# Patient Record
Sex: MALE | Race: BLACK OR AFRICAN AMERICAN | NOT HISPANIC OR LATINO | Employment: OTHER | URBAN - METROPOLITAN AREA
[De-identification: names, ages, dates, MRNs, and addresses within clinical notes are randomized per-mention and may not be internally consistent; named-entity substitution may affect disease eponyms.]

---

## 2017-01-06 ENCOUNTER — INFUSION (OUTPATIENT)
Dept: INFUSION THERAPY | Facility: HOSPITAL | Age: 82
End: 2017-01-06
Attending: INTERNAL MEDICINE
Payer: MEDICARE

## 2017-01-06 VITALS
HEART RATE: 74 BPM | SYSTOLIC BLOOD PRESSURE: 154 MMHG | DIASTOLIC BLOOD PRESSURE: 72 MMHG | TEMPERATURE: 98 F | RESPIRATION RATE: 20 BRPM | WEIGHT: 156.5 LBS | BODY MASS INDEX: 26.87 KG/M2

## 2017-01-06 DIAGNOSIS — C90.00 IGA MYELOMA: ICD-10-CM

## 2017-01-06 DIAGNOSIS — C90.00 IGA MYELOMA: Primary | ICD-10-CM

## 2017-01-06 PROCEDURE — 63600175 PHARM REV CODE 636 W HCPCS: Performed by: INTERNAL MEDICINE

## 2017-01-06 PROCEDURE — 96401 CHEMO ANTI-NEOPL SQ/IM: CPT

## 2017-01-06 RX ORDER — DEXAMETHASONE 4 MG/1
20 TABLET ORAL
Status: COMPLETED | OUTPATIENT
Start: 2017-01-06 | End: 2017-01-06

## 2017-01-06 RX ORDER — ACYCLOVIR 400 MG/1
400 TABLET ORAL DAILY
Qty: 90 TABLET | Refills: 3 | Status: SHIPPED | OUTPATIENT
Start: 2017-01-06 | End: 2017-12-22

## 2017-01-06 RX ORDER — BORTEZOMIB 3.5 MG/1
1.3 INJECTION, POWDER, LYOPHILIZED, FOR SOLUTION INTRAVENOUS; SUBCUTANEOUS
Status: COMPLETED | OUTPATIENT
Start: 2017-01-06 | End: 2017-01-06

## 2017-01-06 RX ADMIN — BORTEZOMIB 2.3 MG: 3.5 INJECTION, POWDER, LYOPHILIZED, FOR SOLUTION INTRAVENOUS; SUBCUTANEOUS at 11:01

## 2017-01-06 RX ADMIN — DEXAMETHASONE 20 MG: 4 TABLET ORAL at 10:01

## 2017-01-13 ENCOUNTER — INFUSION (OUTPATIENT)
Dept: INFUSION THERAPY | Facility: HOSPITAL | Age: 82
End: 2017-01-13
Attending: INTERNAL MEDICINE
Payer: MEDICARE

## 2017-01-13 VITALS
TEMPERATURE: 97 F | HEIGHT: 64 IN | SYSTOLIC BLOOD PRESSURE: 111 MMHG | WEIGHT: 156.5 LBS | HEART RATE: 61 BPM | DIASTOLIC BLOOD PRESSURE: 55 MMHG | RESPIRATION RATE: 20 BRPM | BODY MASS INDEX: 26.72 KG/M2

## 2017-01-13 DIAGNOSIS — C90.00 IGA MYELOMA: Primary | ICD-10-CM

## 2017-01-13 PROCEDURE — 96401 CHEMO ANTI-NEOPL SQ/IM: CPT

## 2017-01-13 PROCEDURE — 63600175 PHARM REV CODE 636 W HCPCS: Performed by: INTERNAL MEDICINE

## 2017-01-13 RX ORDER — DEXAMETHASONE 4 MG/1
20 TABLET ORAL
Status: COMPLETED | OUTPATIENT
Start: 2017-01-13 | End: 2017-01-13

## 2017-01-13 RX ORDER — BORTEZOMIB 3.5 MG/1
1.3 INJECTION, POWDER, LYOPHILIZED, FOR SOLUTION INTRAVENOUS; SUBCUTANEOUS
Status: COMPLETED | OUTPATIENT
Start: 2017-01-13 | End: 2017-01-13

## 2017-01-13 RX ADMIN — DEXAMETHASONE 20 MG: 4 TABLET ORAL at 10:01

## 2017-01-13 RX ADMIN — BORTEZOMIB 2.3 MG: 3.5 INJECTION, POWDER, LYOPHILIZED, FOR SOLUTION INTRAVENOUS; SUBCUTANEOUS at 10:01

## 2017-01-13 NOTE — MR AVS SNAPSHOT
Patient Information     Patient Name Sex Mika Banks Male 1935      Visit Information        Provider Department Dept Phone Center    2017 10:30 AM CHAIR 07 formerly [facility] Chemotherapy Infusion [phone] [facility]      Patient Instructions     None      Your Current Medications Are     acyclovir (ZOVIRAX) 400 MG tablet    amlodipine (NORVASC) 10 MG tablet    atenolol (TENORMIN) 50 MG tablet    bortezomib (VELCADE) 3.5 mg injection    brimonidine 0.15 % OPTH DROP (ALPHAGAN) 0.15 % ophthalmic solution    dexamethasone (DECADRON) 4 MG Tab    difluprednate (DUREZOL) 0.05 % Drop ophthalmic solution    dorzolamide-timolol 2-0.5% (COSOPT) 2-0.5 % ophthalmic solution    gabapentin (NEURONTIN) 100 MG capsule    latanoprost (XALATAN) 0.005 % ophthalmic solution    tamsulosin (FLOMAX) 0.4 mg Cp24      Facility-Administered Medications     bortezomib (VELCADE) injection 2.3 mg    dexamethasone tablet 20 mg      Appointments for Next Year     2017  9:30 AM FASTING LAB (15 min.) [facility], [place]    1. Do not eat or drink anything for TEN HOURS (10) PRIOR TO TEST. Do not chew gum or eat candy mints, even those claiming to be sugar free. Water is allowed but do not drink any other fluids 2. Take your regular daily medicines as your doctor has ordered. If you are diabetic, do not take your insulin or other diabetic medication until your blood is drawn and you are ready to eat. Your physician may have special instructions for diabetics. Check with your doctor if you have any questions.3. Alcoholic beverages are not allowed starting at 6:00pm the evening before your appointment.    2017 10:00 AM PHYSICAL - ESTABLISHED PATIENT (20 min.) [facility] Internal Medicine Lily Valladares MD    Arrive at check-in approximately 15 minutes before your scheduled appointment time. Bring all outside medical records and imaging, along with a list of your current medications and insurance  "card.         Default Flowsheet Data (last 24 hours)      Amb Complex Vitals Ernie        01/13/17 1020                Measurements    Weight 71 kg (156 lb 8.4 oz)        Height 5' 4" (1.626 m)        BSA (Calculated - sq m) 1.79 sq meters        BMI (Calculated) 26.9        BP (!)  111/55        Temp 97.3 °F (36.3 °C)        Pulse 61        Resp 20        Pain Assessment    Pain Score Three        Pain Frequency 2 Intermittent        Pain Loc ABDOMEN                Allergies     No Known Allergies      Medications You Received from 01/12/2017 1050 to 01/13/2017 1050        Date/Time Order Dose Route Action     01/13/2017 1046 bortezomib (VELCADE) injection 2.3 mg 2.3 mg Subcutaneous Given     01/13/2017 1022 dexamethasone tablet 20 mg 20 mg Oral Given      Current Discharge Medication List     Cannot display discharge medications since this is not an admission.      "

## 2017-01-13 NOTE — NURSING
Pt tolerated chemo well (Velcadeinj SQ to LUQ abdomen). No adverse reaction noted. Pt education reinforced on chemo regimen, side effects, what to expect, and when to call . Pt verbalized understanding. I reviewed pt calendar w/ pt/family and understanding verbalized.

## 2017-01-27 ENCOUNTER — INFUSION (OUTPATIENT)
Dept: INFUSION THERAPY | Facility: HOSPITAL | Age: 82
End: 2017-01-27
Attending: INTERNAL MEDICINE
Payer: MEDICARE

## 2017-01-27 VITALS
DIASTOLIC BLOOD PRESSURE: 55 MMHG | HEART RATE: 63 BPM | SYSTOLIC BLOOD PRESSURE: 112 MMHG | HEIGHT: 64 IN | WEIGHT: 156.5 LBS | BODY MASS INDEX: 26.72 KG/M2 | TEMPERATURE: 98 F | RESPIRATION RATE: 20 BRPM

## 2017-01-27 DIAGNOSIS — C90.00 IGA MYELOMA: Primary | ICD-10-CM

## 2017-01-27 PROCEDURE — 63600175 PHARM REV CODE 636 W HCPCS: Mod: JW | Performed by: INTERNAL MEDICINE

## 2017-01-27 PROCEDURE — 96401 CHEMO ANTI-NEOPL SQ/IM: CPT

## 2017-01-27 RX ORDER — BORTEZOMIB 3.5 MG/1
1.3 INJECTION, POWDER, LYOPHILIZED, FOR SOLUTION INTRAVENOUS; SUBCUTANEOUS
Status: COMPLETED | OUTPATIENT
Start: 2017-01-27 | End: 2017-01-27

## 2017-01-27 RX ORDER — DEXAMETHASONE 4 MG/1
20 TABLET ORAL
Status: COMPLETED | OUTPATIENT
Start: 2017-01-27 | End: 2017-01-27

## 2017-01-27 RX ADMIN — BORTEZOMIB 2.3 MG: 3.5 INJECTION, POWDER, LYOPHILIZED, FOR SOLUTION INTRAVENOUS; SUBCUTANEOUS at 09:01

## 2017-01-27 RX ADMIN — DEXAMETHASONE 20 MG: 4 TABLET ORAL at 09:01

## 2017-01-27 NOTE — NURSING
Pt tolerated chemo (Velcade) well. No adverse reaction noted. Pt education reinforced on chemo regimen, side effects, what to expect, and when to call . Pt verbalized understanding. I reviewed pt calendar w/ pt and understanding verbalized.

## 2017-02-03 ENCOUNTER — INFUSION (OUTPATIENT)
Dept: INFUSION THERAPY | Facility: HOSPITAL | Age: 82
End: 2017-02-03
Attending: INTERNAL MEDICINE
Payer: MEDICARE

## 2017-02-03 ENCOUNTER — LAB VISIT (OUTPATIENT)
Dept: LAB | Facility: HOSPITAL | Age: 82
End: 2017-02-03
Attending: INTERNAL MEDICINE
Payer: MEDICARE

## 2017-02-03 VITALS
WEIGHT: 156.5 LBS | TEMPERATURE: 98 F | BODY MASS INDEX: 26.72 KG/M2 | DIASTOLIC BLOOD PRESSURE: 55 MMHG | HEART RATE: 58 BPM | RESPIRATION RATE: 20 BRPM | SYSTOLIC BLOOD PRESSURE: 108 MMHG | HEIGHT: 64 IN

## 2017-02-03 DIAGNOSIS — C90.00 MULTIPLE MYELOMA: ICD-10-CM

## 2017-02-03 DIAGNOSIS — C90.00 IGA MYELOMA: Primary | ICD-10-CM

## 2017-02-03 LAB
IGA SERPL-MCNC: 1230 MG/DL
IGG SERPL-MCNC: 722 MG/DL
IGM SERPL-MCNC: 34 MG/DL

## 2017-02-03 PROCEDURE — 82784 ASSAY IGA/IGD/IGG/IGM EACH: CPT | Mod: 59

## 2017-02-03 PROCEDURE — 83520 IMMUNOASSAY QUANT NOS NONAB: CPT | Mod: 59

## 2017-02-03 PROCEDURE — 96401 CHEMO ANTI-NEOPL SQ/IM: CPT

## 2017-02-03 PROCEDURE — 63600175 PHARM REV CODE 636 W HCPCS: Performed by: INTERNAL MEDICINE

## 2017-02-03 PROCEDURE — 84165 PROTEIN E-PHORESIS SERUM: CPT

## 2017-02-03 PROCEDURE — 84165 PROTEIN E-PHORESIS SERUM: CPT | Mod: 26,,, | Performed by: PATHOLOGY

## 2017-02-03 PROCEDURE — 36415 COLL VENOUS BLD VENIPUNCTURE: CPT

## 2017-02-03 RX ORDER — DEXAMETHASONE 4 MG/1
20 TABLET ORAL
Status: COMPLETED | OUTPATIENT
Start: 2017-02-03 | End: 2017-02-03

## 2017-02-03 RX ORDER — BORTEZOMIB 3.5 MG/1
1.3 INJECTION, POWDER, LYOPHILIZED, FOR SOLUTION INTRAVENOUS; SUBCUTANEOUS
Status: COMPLETED | OUTPATIENT
Start: 2017-02-03 | End: 2017-02-03

## 2017-02-03 RX ADMIN — DEXAMETHASONE 20 MG: 4 TABLET ORAL at 10:02

## 2017-02-03 RX ADMIN — BORTEZOMIB 2.3 MG: 3.5 INJECTION, POWDER, LYOPHILIZED, FOR SOLUTION INTRAVENOUS; SUBCUTANEOUS at 10:02

## 2017-02-03 NOTE — NURSING
Pt tolerated chemo (Velcade) well. No adverse reaction noted. Pt education reinforced on chemo regimen, side effects, what to expect, and when to call . Pt verbalized understanding. I reviewed pt calendar w/ pt and understanding verbalized. Discharged home to self care

## 2017-02-06 LAB
ALBUMIN SERPL ELPH-MCNC: 3.83 G/DL
ALPHA1 GLOB SERPL ELPH-MCNC: 0.33 G/DL
ALPHA2 GLOB SERPL ELPH-MCNC: 0.91 G/DL
B-GLOBULIN SERPL ELPH-MCNC: 2.06 G/DL
GAMMA GLOB SERPL ELPH-MCNC: 0.57 G/DL
KAPPA LC SER QL IA: 1.38 MG/DL
KAPPA LC/LAMBDA SER IA: 1.25
LAMBDA LC SER QL IA: 1.1 MG/DL
PATHOLOGIST INTERPRETATION SPE: NORMAL
PROT SERPL-MCNC: 7.7 G/DL

## 2017-02-10 ENCOUNTER — OFFICE VISIT (OUTPATIENT)
Dept: HEMATOLOGY/ONCOLOGY | Facility: CLINIC | Age: 82
End: 2017-02-10
Payer: MEDICARE

## 2017-02-10 ENCOUNTER — INFUSION (OUTPATIENT)
Dept: INFUSION THERAPY | Facility: HOSPITAL | Age: 82
End: 2017-02-10
Attending: INTERNAL MEDICINE
Payer: MEDICARE

## 2017-02-10 VITALS
WEIGHT: 149.5 LBS | SYSTOLIC BLOOD PRESSURE: 106 MMHG | DIASTOLIC BLOOD PRESSURE: 64 MMHG | BODY MASS INDEX: 24.91 KG/M2 | OXYGEN SATURATION: 98 % | HEART RATE: 60 BPM | HEIGHT: 65 IN | TEMPERATURE: 98 F

## 2017-02-10 VITALS — RESPIRATION RATE: 18 BRPM

## 2017-02-10 DIAGNOSIS — B18.2 CHRONIC HEPATITIS C WITHOUT HEPATIC COMA: ICD-10-CM

## 2017-02-10 DIAGNOSIS — D63.8 ANEMIA OF CHRONIC DISEASE: ICD-10-CM

## 2017-02-10 DIAGNOSIS — C90.00 IGA MYELOMA: Primary | ICD-10-CM

## 2017-02-10 DIAGNOSIS — Z51.11 ENCOUNTER FOR ANTINEOPLASTIC CHEMOTHERAPY: ICD-10-CM

## 2017-02-10 DIAGNOSIS — G62.9 PERIPHERAL POLYNEUROPATHY: ICD-10-CM

## 2017-02-10 PROCEDURE — 99999 PR PBB SHADOW E&M-EST. PATIENT-LVL III: CPT | Mod: PBBFAC,,, | Performed by: INTERNAL MEDICINE

## 2017-02-10 PROCEDURE — 3078F DIAST BP <80 MM HG: CPT | Mod: S$GLB,,, | Performed by: INTERNAL MEDICINE

## 2017-02-10 PROCEDURE — 96401 CHEMO ANTI-NEOPL SQ/IM: CPT

## 2017-02-10 PROCEDURE — 63600175 PHARM REV CODE 636 W HCPCS: Mod: JW | Performed by: INTERNAL MEDICINE

## 2017-02-10 PROCEDURE — 1159F MED LIST DOCD IN RCRD: CPT | Mod: S$GLB,,, | Performed by: INTERNAL MEDICINE

## 2017-02-10 PROCEDURE — 1160F RVW MEDS BY RX/DR IN RCRD: CPT | Mod: S$GLB,,, | Performed by: INTERNAL MEDICINE

## 2017-02-10 PROCEDURE — 99214 OFFICE O/P EST MOD 30 MIN: CPT | Mod: S$GLB,,, | Performed by: INTERNAL MEDICINE

## 2017-02-10 PROCEDURE — 1157F ADVNC CARE PLAN IN RCRD: CPT | Mod: S$GLB,,, | Performed by: INTERNAL MEDICINE

## 2017-02-10 PROCEDURE — 3074F SYST BP LT 130 MM HG: CPT | Mod: S$GLB,,, | Performed by: INTERNAL MEDICINE

## 2017-02-10 PROCEDURE — 1126F AMNT PAIN NOTED NONE PRSNT: CPT | Mod: S$GLB,,, | Performed by: INTERNAL MEDICINE

## 2017-02-10 RX ORDER — BORTEZOMIB 3.5 MG/1
1.3 INJECTION, POWDER, LYOPHILIZED, FOR SOLUTION INTRAVENOUS; SUBCUTANEOUS
Status: COMPLETED | OUTPATIENT
Start: 2017-02-10 | End: 2017-02-10

## 2017-02-10 RX ORDER — DEXAMETHASONE 4 MG/1
20 TABLET ORAL
Status: COMPLETED | OUTPATIENT
Start: 2017-02-10 | End: 2017-02-10

## 2017-02-10 RX ADMIN — BORTEZOMIB 2.3 MG: 3.5 INJECTION, POWDER, LYOPHILIZED, FOR SOLUTION INTRAVENOUS; SUBCUTANEOUS at 10:02

## 2017-02-10 RX ADMIN — DEXAMETHASONE 20 MG: 4 TABLET ORAL at 10:02

## 2017-02-10 NOTE — MR AVS SNAPSHOT
Patient Information     Patient Name Sex     Mika Valenzuela Male 1935      Visit Information        Provider Department Dept Phone Center    2/10/2017 11:30 AM CHAIR 05 [facility] Chemotherapy Infusion [phone] \[facility] [place] [facility]\""      Patient Instructions     None      Your Current Medications Are     amlodipine (NORVASC) 10 MG tablet    atenolol (TENORMIN) 50 MG tablet    bortezomib (VELCADE) 3.5 mg injection    brimonidine 0.15 % OPTH DROP (ALPHAGAN) 0.15 % ophthalmic solution    dexamethasone (DECADRON) 4 MG Tab    difluprednate (DUREZOL) 0.05 % Drop ophthalmic solution    dorzolamide-timolol 2-0.5% (COSOPT) 2-0.5 % ophthalmic solution    gabapentin (NEURONTIN) 100 MG capsule    latanoprost (XALATAN) 0.005 % ophthalmic solution    tamsulosin (FLOMAX) 0.4 mg Cp24    acyclovir (ZOVIRAX) 400 MG tablet      Facility-Administered Medications     bortezomib (VELCADE) injection 2.3 mg    dexamethasone tablet 20 mg      Appointments for Next Year     2/10/2017 11:30 AM INFUSION 030 MIN (30 min.) [facility] CHAIR 05 [facility]    Arrive at check-in approximately 15 minutes before your scheduled appointment time. Bring all outside medical records and imaging, along with a list of your current medications and insurance card.    [address]    2017  9:30 AM FASTING LAB (15 min.) [facility], [place]    1. Do not eat or drink anything for TEN HOURS (10) PRIOR TO TEST. Do not chew gum or eat candy mints, even those claiming to be sugar free. Water is allowed but do not drink any other fluids 2. Take your regular daily medicines as your doctor has ordered. If you are diabetic, do not take your insulin or other diabetic medication until your blood is drawn and you are ready to eat. Your physician may have special instructions for diabetics. Check with your doctor if you have any questions.3. Alcoholic beverages are not allowed starting at 6:00pm the evening before your  "appointment.    7/21/2017 10:00 AM PHYSICAL - ESTABLISHED PATIENT (20 min.) [facility] Internal Medicine Lily Valladares MD    Arrive at check-in approximately 15 minutes before your scheduled appointment time. Bring all outside medical records and imaging, along with a list of your current medications and insurance card.         Default Flowsheet Data (last 24 hours)      Amb Complex Vitals Ernie        02/10/17 1022                Measurements    Weight 67.8 kg (149 lb 7.6 oz)        Height 5' 5" (1.651 m)        BSA (Calculated - sq m) 1.76 sq meters        BMI (Calculated) 24.9        /64        Temp 97.6 °F (36.4 °C)        Pulse 60        SpO2 98 %        Pain Assessment    Pain Score Zero                Allergies     No Known Allergies      Medications You Received from 02/09/2017 1104 to 02/10/2017 1104        Date/Time Order Dose Route Action     02/10/2017 1058 bortezomib (VELCADE) injection 2.3 mg 2.3 mg Subcutaneous Given     02/10/2017 1057 dexamethasone tablet 20 mg 20 mg Oral Given      Current Discharge Medication List     Cannot display discharge medications since this is not an admission.      "

## 2017-02-10 NOTE — PROGRESS NOTES
Subjective:       Patient ID: Mika Valenzuela is a 81 y.o. male.    Chief Complaint: Multiple Myeloma (lab results)    HPI He has IgA kappa multiple myeloma and is here for a followup clinic visit.  He also has chronic hepatitis C and was deemed not to be a candidate for therapy.    He presents to the clinic for a followup visit accompanied by his sister, Poly Guerrero. He has 2 daughters and they live in [place].    Has some chronic pain in the back that shoots down his right leg.    He started Velcade with dexamethasone (20 mg po with each dose of velcade) for his myeloma on 9/3/2014. He was on D 1,8,15 every 4 week schedule.   Last Rx was on 12/24/14.    Due to progression of myeloma parameters,   He restarted Velcade/Dex on 3/24/16.  Tolerating it well.    Review of Systems   Constitutional: Negative for fever and unexpected weight change.   HENT: Negative for sore throat and tinnitus.    Respiratory: Negative for wheezing and stridor.    Cardiovascular: Negative for chest pain and palpitations.   Gastrointestinal: Negative for abdominal pain and constipation.   Musculoskeletal: Positive for arthralgias. Negative for back pain.   Neurological: Positive for numbness. Negative for seizures and headaches.   Hematological: Negative for adenopathy. Does not bruise/bleed easily.       Objective:      Physical Exam   Constitutional: He is oriented to person, place, and time. He appears well-developed and well-nourished. No distress.   Eyes: Conjunctivae and lids are normal. No scleral icterus.   Neck: Normal range of motion. Neck supple. No thyromegaly present.   Cardiovascular: Normal rate, regular rhythm and intact distal pulses.  Exam reveals no gallop.    Pulmonary/Chest: Effort normal and breath sounds normal. No respiratory distress. He has no rales.   Abdominal: Soft. Bowel sounds are normal. He exhibits no distension and no mass. There is no hepatosplenomegaly.   Lymphadenopathy:        Head (right side): No  submental adenopathy present.        Head (left side): No submental adenopathy present.     He has no cervical adenopathy.        Right: No supraclavicular adenopathy present.        Left: No supraclavicular adenopathy present.   Neurological: He is alert and oriented to person, place, and time.   Skin: Skin is warm. He is not diaphoretic. No cyanosis. Nails show no clubbing.         Assessment:     1. IgA myeloma    2. Encounter for antineoplastic chemotherapy    3. Hepatitis C, without hepatic coma    4. Back pain    5. Left knee pain    6. Pain      Plan:   He was treated with 4 cycles of Velcade/dexamethasone for IgA myeloma.  Last injection of Velcade was administered on 12/24/14.      Due to worsening myeloma parameters - Worsening IgA Kappa paraprotein band measuring 3.04 gm/dL, worsening IgA level at 2793 mg/dL and impending end-organ damage Myeloma therapy was restarted on 3/24/16.    Labs reviewed.    Myeloma parameters now stable on Velcade/Decadron D1, D8, D15 every 28 day schedule.    Will give him a chemotherapy break, after today's dose.    Repeat labs and follow-up in 3 months.

## 2017-05-24 ENCOUNTER — LAB VISIT (OUTPATIENT)
Dept: LAB | Facility: HOSPITAL | Age: 82
End: 2017-05-24
Attending: INTERNAL MEDICINE
Payer: MEDICARE

## 2017-05-24 DIAGNOSIS — C90.00 MULTIPLE MYELOMA: ICD-10-CM

## 2017-05-24 LAB
ALBUMIN SERPL BCP-MCNC: 3.8 G/DL
ALP SERPL-CCNC: 80 U/L
ALT SERPL W/O P-5'-P-CCNC: 15 U/L
ANION GAP SERPL CALC-SCNC: 11 MMOL/L
AST SERPL-CCNC: 26 U/L
BASOPHILS # BLD AUTO: 0.02 K/UL
BASOPHILS NFR BLD: 0.3 %
BILIRUB SERPL-MCNC: 0.4 MG/DL
BUN SERPL-MCNC: 9 MG/DL
CALCIUM SERPL-MCNC: 9.3 MG/DL
CHLORIDE SERPL-SCNC: 104 MMOL/L
CO2 SERPL-SCNC: 26 MMOL/L
CREAT SERPL-MCNC: 0.9 MG/DL
DIFFERENTIAL METHOD: ABNORMAL
EOSINOPHIL # BLD AUTO: 0.1 K/UL
EOSINOPHIL NFR BLD: 1.8 %
ERYTHROCYTE [DISTWIDTH] IN BLOOD BY AUTOMATED COUNT: 14 %
EST. GFR  (AFRICAN AMERICAN): >60 ML/MIN/1.73 M^2
EST. GFR  (NON AFRICAN AMERICAN): >60 ML/MIN/1.73 M^2
GLUCOSE SERPL-MCNC: 74 MG/DL
HCT VFR BLD AUTO: 36.2 %
HGB BLD-MCNC: 12.5 G/DL
IGA SERPL-MCNC: 1834 MG/DL
IGG SERPL-MCNC: 783 MG/DL
IGM SERPL-MCNC: 26 MG/DL
LYMPHOCYTES # BLD AUTO: 2.1 K/UL
LYMPHOCYTES NFR BLD: 32.7 %
MCH RBC QN AUTO: 32.4 PG
MCHC RBC AUTO-ENTMCNC: 34.5 %
MCV RBC AUTO: 94 FL
MONOCYTES # BLD AUTO: 0.7 K/UL
MONOCYTES NFR BLD: 10.5 %
NEUTROPHILS # BLD AUTO: 3.4 K/UL
NEUTROPHILS NFR BLD: 54.5 %
PLATELET # BLD AUTO: 223 K/UL
PMV BLD AUTO: 9.6 FL
POTASSIUM SERPL-SCNC: 4.1 MMOL/L
PROT SERPL-MCNC: 8.6 G/DL
RBC # BLD AUTO: 3.86 M/UL
SODIUM SERPL-SCNC: 141 MMOL/L
WBC # BLD AUTO: 6.26 K/UL

## 2017-05-24 PROCEDURE — 84165 PROTEIN E-PHORESIS SERUM: CPT | Mod: 26,,, | Performed by: PATHOLOGY

## 2017-05-24 PROCEDURE — 80053 COMPREHEN METABOLIC PANEL: CPT

## 2017-05-24 PROCEDURE — 83520 IMMUNOASSAY QUANT NOS NONAB: CPT

## 2017-05-24 PROCEDURE — 85025 COMPLETE CBC W/AUTO DIFF WBC: CPT

## 2017-05-24 PROCEDURE — 82784 ASSAY IGA/IGD/IGG/IGM EACH: CPT | Mod: 59

## 2017-05-24 PROCEDURE — 36415 COLL VENOUS BLD VENIPUNCTURE: CPT

## 2017-05-24 PROCEDURE — 84165 PROTEIN E-PHORESIS SERUM: CPT

## 2017-05-25 LAB
ALBUMIN SERPL ELPH-MCNC: 3.96 G/DL
ALPHA1 GLOB SERPL ELPH-MCNC: 0.34 G/DL
ALPHA2 GLOB SERPL ELPH-MCNC: 0.79 G/DL
B-GLOBULIN SERPL ELPH-MCNC: 2.88 G/DL
GAMMA GLOB SERPL ELPH-MCNC: 0.64 G/DL
KAPPA LC SER QL IA: 1.72 MG/DL
KAPPA LC/LAMBDA SER IA: 1.62
LAMBDA LC SER QL IA: 1.06 MG/DL
PATHOLOGIST INTERPRETATION SPE: NORMAL
PROT SERPL-MCNC: 8.6 G/DL

## 2017-05-31 ENCOUNTER — OFFICE VISIT (OUTPATIENT)
Dept: HEMATOLOGY/ONCOLOGY | Facility: CLINIC | Age: 82
End: 2017-05-31
Payer: MEDICARE

## 2017-05-31 VITALS
BODY MASS INDEX: 24.24 KG/M2 | TEMPERATURE: 98 F | HEIGHT: 65 IN | OXYGEN SATURATION: 97 % | WEIGHT: 145.5 LBS | SYSTOLIC BLOOD PRESSURE: 104 MMHG | HEART RATE: 64 BPM | DIASTOLIC BLOOD PRESSURE: 60 MMHG

## 2017-05-31 DIAGNOSIS — F03.91 DEMENTIA WITH BEHAVIORAL DISTURBANCE, UNSPECIFIED DEMENTIA TYPE: Primary | ICD-10-CM

## 2017-05-31 DIAGNOSIS — F01.518 VASCULAR DEMENTIA WITH BEHAVIOR DISTURBANCE: ICD-10-CM

## 2017-05-31 DIAGNOSIS — C90.00 IGA MYELOMA: ICD-10-CM

## 2017-05-31 DIAGNOSIS — Z09 FOLLOW-UP EXAMINATION FOLLOWING COMPLETED TREATMENT WITH HIGH-RISK MEDICATIONS, NOT ELSEWHERE CLASSIFIED: ICD-10-CM

## 2017-05-31 PROCEDURE — 99215 OFFICE O/P EST HI 40 MIN: CPT | Mod: S$GLB,,, | Performed by: INTERNAL MEDICINE

## 2017-05-31 PROCEDURE — 99999 PR PBB SHADOW E&M-EST. PATIENT-LVL III: CPT | Mod: PBBFAC,,, | Performed by: INTERNAL MEDICINE

## 2017-05-31 PROCEDURE — 1126F AMNT PAIN NOTED NONE PRSNT: CPT | Mod: S$GLB,,, | Performed by: INTERNAL MEDICINE

## 2017-05-31 PROCEDURE — 1159F MED LIST DOCD IN RCRD: CPT | Mod: S$GLB,,, | Performed by: INTERNAL MEDICINE

## 2017-05-31 NOTE — PROGRESS NOTES
Subjective:       Patient ID: Mika Valenzuela is a 81 y.o. male.    Chief Complaint: Multiple Myeloma (lab results)    HPI     Here for f/u of IgA kappa multiple myeloma.    Accompanied by his sister, Poly Guerrero. He has 2 daughters and they live in [place].    He started Velcade with dexamethasone (20 mg po with each dose of velcade) for his myeloma on 9/3/2014. He was on D 1,8,15 every 4 week schedule.   Last Rx was on 12/24/14. The he got a chemo break. Due to progression of myeloma parameters, he restarted Velcade/Dex on 3/24/16 until Feb 2017, then got another chemo break.    His sister tells me he lives at home with another sister.  The patient sometimes talks to people who are not around him.  He is not completely able to take care of himself.  He sometimes pulls clothes out of the closet and puts them back in.  They're thinking about putting him in a nursing home.    Review of Systems   Unable to perform ROS: Dementia       Objective:      Physical Exam   Constitutional: He appears well-developed and well-nourished. No distress.   Eyes: Conjunctivae and lids are normal. No scleral icterus.   Neck: Normal range of motion. Neck supple. No thyromegaly present.   Cardiovascular: Normal rate, regular rhythm and intact distal pulses.  Exam reveals no gallop.    Pulmonary/Chest: Effort normal and breath sounds normal. No respiratory distress. He has no rales.   Abdominal: Soft. Bowel sounds are normal. He exhibits no distension and no mass. There is no hepatosplenomegaly.   Lymphadenopathy:        Head (right side): No submental adenopathy present.        Head (left side): No submental adenopathy present.     He has no cervical adenopathy.        Right: No supraclavicular adenopathy present.        Left: No supraclavicular adenopathy present.   Neurological: He is alert.   Skin: Skin is warm. He is not diaphoretic. No cyanosis. Nails show no clubbing.         Assessment:     1. Dementia with behavioral disturbance,  unspecified dementia type    2. IgA myeloma    3. Follow-up examination following completed treatment with high-risk medications, not elsewhere classified    4. Vascular dementia with behavior disturbance      Plan:   His last chemotherapy treatment for his myeloma was on February 10, 2017.  I reviewed his myeloma per meters and they are progressive but there is no indication to restart his treatment at this time.    A more pressing issue is the fact that he possibly could be having a component of dementia.  I discussed this aspect with the patient's sister in some detail today.  They are considering putting him in a nursing home but I don't see a formal diagnosis of dementia or a neurology evaluation.  They are agreeable to me sending a referral to neurology for this purpose.  Will arrange this.    Otherwise I plan to see him back for follow-up in 3 months with repeat labs and myeloma parameters.  If he does end up in a nursing home due to progressive/worsening dementia then he will not be a candidate for any further active myeloma therapy.

## 2017-06-21 ENCOUNTER — LAB VISIT (OUTPATIENT)
Dept: LAB | Facility: HOSPITAL | Age: 82
End: 2017-06-21
Attending: INTERNAL MEDICINE
Payer: MEDICARE

## 2017-06-21 DIAGNOSIS — I10 ESSENTIAL HYPERTENSION: ICD-10-CM

## 2017-06-21 LAB
ALBUMIN SERPL BCP-MCNC: 3.6 G/DL
ALP SERPL-CCNC: 71 U/L
ALT SERPL W/O P-5'-P-CCNC: 16 U/L
ANION GAP SERPL CALC-SCNC: 12 MMOL/L
AST SERPL-CCNC: 28 U/L
BILIRUB SERPL-MCNC: 0.6 MG/DL
BUN SERPL-MCNC: 14 MG/DL
CALCIUM SERPL-MCNC: 9.7 MG/DL
CHLORIDE SERPL-SCNC: 108 MMOL/L
CHOLEST/HDLC SERPL: 2.4 {RATIO}
CO2 SERPL-SCNC: 21 MMOL/L
CREAT SERPL-MCNC: 1.2 MG/DL
EST. GFR  (AFRICAN AMERICAN): >60 ML/MIN/1.73 M^2
EST. GFR  (NON AFRICAN AMERICAN): 56.4 ML/MIN/1.73 M^2
GLUCOSE SERPL-MCNC: 99 MG/DL
HDL/CHOLESTEROL RATIO: 41.4 %
HDLC SERPL-MCNC: 111 MG/DL
HDLC SERPL-MCNC: 46 MG/DL
LDLC SERPL CALC-MCNC: 50.6 MG/DL
NONHDLC SERPL-MCNC: 65 MG/DL
POTASSIUM SERPL-SCNC: 3.7 MMOL/L
PROT SERPL-MCNC: 8.7 G/DL
SODIUM SERPL-SCNC: 141 MMOL/L
TRIGL SERPL-MCNC: 72 MG/DL
TSH SERPL DL<=0.005 MIU/L-ACNC: 2.25 UIU/ML

## 2017-06-21 PROCEDURE — 80061 LIPID PANEL: CPT

## 2017-06-21 PROCEDURE — 84443 ASSAY THYROID STIM HORMONE: CPT

## 2017-06-21 PROCEDURE — 36415 COLL VENOUS BLD VENIPUNCTURE: CPT | Mod: PO

## 2017-06-21 PROCEDURE — 80053 COMPREHEN METABOLIC PANEL: CPT

## 2017-06-30 ENCOUNTER — OFFICE VISIT (OUTPATIENT)
Dept: NEUROLOGY | Facility: CLINIC | Age: 82
End: 2017-06-30
Attending: INTERNAL MEDICINE
Payer: MEDICARE

## 2017-06-30 VITALS
WEIGHT: 141.38 LBS | HEIGHT: 65 IN | BODY MASS INDEX: 23.56 KG/M2 | DIASTOLIC BLOOD PRESSURE: 71 MMHG | HEART RATE: 75 BPM | SYSTOLIC BLOOD PRESSURE: 124 MMHG

## 2017-06-30 DIAGNOSIS — C90.00 IGA MYELOMA: ICD-10-CM

## 2017-06-30 DIAGNOSIS — G63 POLYNEUROPATHY ASSOCIATED WITH UNDERLYING DISEASE: Primary | ICD-10-CM

## 2017-06-30 DIAGNOSIS — F02.80 DEMENTIA ASSOCIATED WITH OTHER UNDERLYING DISEASE WITHOUT BEHAVIORAL DISTURBANCE: ICD-10-CM

## 2017-06-30 PROCEDURE — 99204 OFFICE O/P NEW MOD 45 MIN: CPT | Mod: S$GLB,,, | Performed by: PSYCHIATRY & NEUROLOGY

## 2017-06-30 PROCEDURE — 1125F AMNT PAIN NOTED PAIN PRSNT: CPT | Mod: S$GLB,,, | Performed by: PSYCHIATRY & NEUROLOGY

## 2017-06-30 PROCEDURE — 1159F MED LIST DOCD IN RCRD: CPT | Mod: S$GLB,,, | Performed by: PSYCHIATRY & NEUROLOGY

## 2017-06-30 PROCEDURE — 99999 PR PBB SHADOW E&M-EST. PATIENT-LVL III: CPT | Mod: PBBFAC,,, | Performed by: PSYCHIATRY & NEUROLOGY

## 2017-06-30 RX ORDER — VENLAFAXINE HYDROCHLORIDE 37.5 MG/1
37.5 CAPSULE, EXTENDED RELEASE ORAL DAILY
Qty: 30 CAPSULE | Refills: 1 | Status: SHIPPED | OUTPATIENT
Start: 2017-06-30 | End: 2017-09-10 | Stop reason: SDUPTHER

## 2017-06-30 NOTE — PATIENT INSTRUCTIONS
Discussed with sister.  The patient has a progressive dementia that could be related to combination of factors including the history of chemotherapy, and an underlying vascular dementia.  He also has a peripheral neuropathy.  We will get a CT scan of the brain, noncontrast.  Initiate Effexor XR 37.5 mg daily.  This may help the behavioral issues as well as symptoms related to the peripheral neuropathy.

## 2017-06-30 NOTE — PROGRESS NOTES
Subjective:       Patient ID: Mika Valenzuela is a 81 y.o. male.    Chief Complaint:  Memory Loss      History of Present Illness  HPI   This is an 81-year-old -American male who was referred for evaluation of memory difficulties.  The patient has history of multiple myeloma and is presently in treatment and chemotherapy.  He lives with a sister.  Another sister accompanied him today and was the primary historian as the patient could not give any adequate history.  She reports that there has been a gradual decline in memory for at least a year with significant difficulty with retaining recent information or remembering conversations they may have had.  Over the past several months in addition she reports that he sees people and gets in the room that are not around during which time he occasionally talks to them.  He is not able to relate if the Topamax to him or if he is hearing anything out of the ordinary.  He also has occasional paranoid thinking in that when he cannot find things he wants he reports that someone was stolen them.  He does need some help with dressing.  He has no difficulty with personal hygiene.  His spends most of his time in the house and occasionally gets out into the yard but does not do very much otherwise.  He has no difficulty with sleep and no aggressive or agitated behaviors are noted.       Review of Systems  Review of Systems   Constitutional: Negative.    HENT: Negative.  Negative for hearing loss.    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   Respiratory: Negative.  Negative for shortness of breath.    Cardiovascular: Negative.  Negative for chest pain and palpitations.   Gastrointestinal: Negative.    Endocrine: Negative.    Genitourinary: Negative.    Musculoskeletal: Positive for back pain. Negative for gait problem.   Skin: Negative.    Allergic/Immunologic: Negative.    Neurological: Negative.  Negative for dizziness, tremors,  seizures, syncope, speech difficulty, weakness, numbness and headaches.   Hematological: Negative.    Psychiatric/Behavioral: Positive for confusion, decreased concentration and hallucinations. Negative for sleep disturbance.       Objective:      Neurologic Exam    Physical Exam   Constitutional: He appears well-developed and well-nourished.   HENT:   Head: Normocephalic and atraumatic.   Right Ear: Hearing normal.   Left Ear: Hearing normal.   Eyes:   Blind in the right eye secondary to corneal opacity.  EOM full without nystagmus   Neck: Normal range of motion. Neck supple. Carotid bruit is not present.   Neurological: He is alert. He displays abnormal reflex (DTRs generally depressed to absent distally). He displays no atrophy and no tremor. A sensory deficit (primary sensations intact though diminished distally with some hypersensitivity in the feet) is present. No cranial nerve deficit (No facial asymmetry noted with facial movements and sensory exam being normal/symmetrical.  Corneals/gag reflexes normal.  Tongue & palate movements normal.  Hearing unimpaired.  Shoulder shrug was norm). He exhibits normal muscle tone. He displays a negative Romberg sign. Coordination and gait normal. He displays no Babinski's sign on the right side. He displays no Babinski's sign on the left side.   Mental status examination: Patient is oriented to place and grossly to person.  He knows that his sister is with him but has difficulty with recalling the name.  He is not able to give an adequate recent history.  Immediate recall is 0/3 after 1 minute.  Impaired attention span and concentration, and poor judgment and insight.  Language functions are intact with no evidence of aphasia or dysarthria.  Comprehension is unimpaired.  Affect is appropriate, mood was even.  No thought disorder is noted.   Vitals reviewed.        Assessment:        1. Polyneuropathy associated with underlying disease    2. Dementia associated with other  underlying disease without behavioral disturbance    3. IgA myeloma            Plan:       Discussed with sister.  The patient has a progressive dementia that could be related to combination of factors including the history of chemotherapy, and an underlying vascular dementia.  He also has a peripheral neuropathy.  We will get a CT scan of the brain, noncontrast.  Initiate Effexor XR 37.5 mg daily.  This may help the behavioral issues as well as symptoms related to the peripheral neuropathy.  He will follow-up in one month.

## 2017-07-07 ENCOUNTER — HOSPITAL ENCOUNTER (OUTPATIENT)
Dept: RADIOLOGY | Facility: HOSPITAL | Age: 82
Discharge: HOME | End: 2017-07-07
Attending: PSYCHIATRY & NEUROLOGY
Payer: MEDICARE

## 2017-07-07 DIAGNOSIS — C90.00 IGA MYELOMA: ICD-10-CM

## 2017-07-07 DIAGNOSIS — F02.80 DEMENTIA ASSOCIATED WITH OTHER UNDERLYING DISEASE WITHOUT BEHAVIORAL DISTURBANCE: ICD-10-CM

## 2017-07-07 PROCEDURE — 70450 CT HEAD/BRAIN W/O DYE: CPT | Mod: TC

## 2017-07-07 PROCEDURE — 70450 CT HEAD/BRAIN W/O DYE: CPT | Mod: 26,,, | Performed by: RADIOLOGY

## 2017-07-21 ENCOUNTER — OFFICE VISIT (OUTPATIENT)
Dept: INTERNAL MEDICINE | Facility: CLINIC | Age: 82
End: 2017-07-21
Payer: MEDICARE

## 2017-07-21 VITALS
DIASTOLIC BLOOD PRESSURE: 70 MMHG | BODY MASS INDEX: 22.62 KG/M2 | SYSTOLIC BLOOD PRESSURE: 118 MMHG | HEIGHT: 64 IN | WEIGHT: 132.5 LBS | HEART RATE: 64 BPM

## 2017-07-21 DIAGNOSIS — C90.00 IGA MYELOMA: ICD-10-CM

## 2017-07-21 DIAGNOSIS — G63 POLYNEUROPATHY ASSOCIATED WITH UNDERLYING DISEASE: ICD-10-CM

## 2017-07-21 DIAGNOSIS — R63.4 WEIGHT LOSS: ICD-10-CM

## 2017-07-21 DIAGNOSIS — F01.518 VASCULAR DEMENTIA WITH BEHAVIOR DISTURBANCE: Primary | ICD-10-CM

## 2017-07-21 DIAGNOSIS — I10 ESSENTIAL HYPERTENSION: ICD-10-CM

## 2017-07-21 DIAGNOSIS — R53.81 DEBILITY: ICD-10-CM

## 2017-07-21 DIAGNOSIS — Z91.89 AT RISK FOR WEIGHT LOSS: ICD-10-CM

## 2017-07-21 PROCEDURE — 1126F AMNT PAIN NOTED NONE PRSNT: CPT | Mod: S$GLB,,, | Performed by: INTERNAL MEDICINE

## 2017-07-21 PROCEDURE — 1159F MED LIST DOCD IN RCRD: CPT | Mod: S$GLB,,, | Performed by: INTERNAL MEDICINE

## 2017-07-21 PROCEDURE — 99999 PR PBB SHADOW E&M-EST. PATIENT-LVL III: CPT | Mod: PBBFAC,,, | Performed by: INTERNAL MEDICINE

## 2017-07-21 PROCEDURE — 99214 OFFICE O/P EST MOD 30 MIN: CPT | Mod: S$GLB,,, | Performed by: INTERNAL MEDICINE

## 2017-07-21 NOTE — PROGRESS NOTES
Subjective:       Patient ID: Mika Valenzuela is a 81 y.o. male.    Chief Complaint: For recent follow-up after emergency department and follow-up of chronic medical conditions.    HPI 81-year-old male presents to clinic today patient recently went to the emergency department for mental status change she reports actually his sister reports that the other sister thought that he was sleeping and they were having difficulty arousing him.  She was concerned and called EMS.  He woke up and was acting his normal self.  Patient has lost some weight since her last visit his sister does report irregular meal intake and that he doesn't eat well.  She states that when he has food he seems to have a good appetite.  May be some limitation on resources here.  Lives with another sister.  Patient is an extremely poor historian.  As he's also developed dementia, it is very difficult to communicate with him.  Review of Systems  otherwise negative  Objective:      Physical Exam  General: Well-appearing, well-nourished.  No distress  HEENT: conjunctivae are normal.  Pupils are equal and reative to light.  TM's are clear and intact bilaterally.  Hearing is grossly normal.  Nasopharynx is clear.  Oropharynx is clear.  Neck: Supple.  No thyroid megaly.  No bruits.  Lymph: No cervical or supraclavicular adenopathy.  Heart: Regular rate and rhythm, without murmur, rub or gallop.  Lungs: Clear to auscultation; respiratory effort normal.  Abdomen: Soft, nontender, nondistended.  Normoactive bowel sounds.  No hepatomegaly.  No masses.  Extremities: Good distal pulses.  No edema.  Psych: Oriented to time person place.  Judgment and insight seem unimpaired.  Mood and affect are appropriate.  Assessment:       1. Vascular dementia with behavior disturbance    2. Polyneuropathy associated with underlying disease    3. IgA myeloma    4. Essential hypertension    5. Debility    6. Weight loss    7. At risk for weight loss        Plan:       Mika  was seen today for annual exam.    Diagnoses and all orders for this visit:    Vascular dementia with behavior disturbance  -     Ambulatory referral to Home Health  Followed by neurology  Polyneuropathy associated with underlying disease  -     Ambulatory referral to Home Health  Followed by neurology  IgA myeloma  Followed by oncology  Essential hypertension  Controlled.  Continue current medical regimen.  Prescription refills addressed.  Followup advised. See after visit summary.  Debility  -     Ambulatory referral to Home Health  -     Ambulatory Referral to Social Work    Weight loss  -     Ambulatory referral to Home Health  -     Ambulatory Referral to Social Work    At risk for weight loss  -     Ambulatory referral to Home Health  -     Ambulatory Referral to Social Work

## 2017-08-04 ENCOUNTER — OFFICE VISIT (OUTPATIENT)
Dept: NEUROLOGY | Facility: CLINIC | Age: 82
End: 2017-08-04
Payer: MEDICARE

## 2017-08-04 VITALS
WEIGHT: 134.69 LBS | BODY MASS INDEX: 22.99 KG/M2 | HEART RATE: 62 BPM | DIASTOLIC BLOOD PRESSURE: 61 MMHG | HEIGHT: 64 IN | SYSTOLIC BLOOD PRESSURE: 111 MMHG

## 2017-08-04 DIAGNOSIS — I10 ESSENTIAL HYPERTENSION: ICD-10-CM

## 2017-08-04 DIAGNOSIS — G63 POLYNEUROPATHY ASSOCIATED WITH UNDERLYING DISEASE: ICD-10-CM

## 2017-08-04 DIAGNOSIS — C90.00 IGA MYELOMA: ICD-10-CM

## 2017-08-04 DIAGNOSIS — F01.518 VASCULAR DEMENTIA WITH BEHAVIOR DISTURBANCE: Primary | ICD-10-CM

## 2017-08-04 PROCEDURE — 99999 PR PBB SHADOW E&M-EST. PATIENT-LVL III: CPT | Mod: PBBFAC,,, | Performed by: PSYCHIATRY & NEUROLOGY

## 2017-08-04 PROCEDURE — 99214 OFFICE O/P EST MOD 30 MIN: CPT | Mod: S$GLB,,, | Performed by: PSYCHIATRY & NEUROLOGY

## 2017-08-04 PROCEDURE — 1125F AMNT PAIN NOTED PAIN PRSNT: CPT | Mod: S$GLB,,, | Performed by: PSYCHIATRY & NEUROLOGY

## 2017-08-04 PROCEDURE — 1159F MED LIST DOCD IN RCRD: CPT | Mod: S$GLB,,, | Performed by: PSYCHIATRY & NEUROLOGY

## 2017-08-04 PROCEDURE — 3008F BODY MASS INDEX DOCD: CPT | Mod: S$GLB,,, | Performed by: PSYCHIATRY & NEUROLOGY

## 2017-08-04 RX ORDER — MEMANTINE HYDROCHLORIDE 5 MG/1
5 TABLET ORAL DAILY
Qty: 30 TABLET | Refills: 5 | Status: ON HOLD | OUTPATIENT
Start: 2017-08-04 | End: 2017-12-27 | Stop reason: HOSPADM

## 2017-08-04 NOTE — PATIENT INSTRUCTIONS
Discussed with sister.  The patient has a progressive dementia that could be related to combination of factors including the history of chemotherapy, and an underlying vascular dementia.  He also has a peripheral neuropathy.  Initiate Namenda 5 mg daily in the morning and continue Effexor XR 37.5 mg daily at bedtime.  This may help the behavioral issues as well as symptoms related to the peripheral neuropathy.

## 2017-08-04 NOTE — PROGRESS NOTES
Subjective:       Patient ID: Mika Valenzuela is a 81 y.o. male.    Chief Complaint:  Memory Loss and Peripheral Neuropathy      History of Present Illness  HPI     This is an 81-year-old -American male who had been seen by me with complaints of memory difficulties, cardiac months ago.  The patient has history of multiple myeloma and is presently in treatment and chemotherapy.  He lives with a sister.  Another sister accompanied him today and was the primary historian as the patient could not give any adequate history.  She reports that there has been a gradual decline in memory for at least a year with significant difficulty with retaining recent information or remembering conversations they may have had.  Over the past several months in addition she reports that he sees people and gets in the room that are not around during which time he occasionally talks to them.  He also has occasional paranoid thinking in that when he cannot find things he wants he reports that someone was stolen them.  He does need some help with dressing.  He has no difficulty with personal hygiene.  His spends most of his time in the house and occasionally gets out into the yard but does not do very much otherwise.  He has no difficulty with sleep and no aggressive or agitated behaviors are noted.  He is blind in the right eye.  A CT scan of the head done following his last visit was unremarkable.  He was started on venlafaxine at bedtime which has helped his sleep though still occasionally gets up around 4 AM.  His daytime behaviors have also improved.       Review of Systems  Review of Systems   Constitutional: Negative.    HENT: Negative.  Negative for hearing loss.    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   Respiratory: Negative.  Negative for shortness of breath.    Cardiovascular: Negative.  Negative for chest pain and palpitations.   Gastrointestinal: Negative.    Endocrine:  Negative.    Genitourinary: Negative.    Musculoskeletal: Positive for back pain. Negative for gait problem.   Skin: Negative.    Allergic/Immunologic: Negative.    Neurological: Negative.  Negative for dizziness, tremors, seizures, syncope, speech difficulty, weakness, numbness and headaches.   Hematological: Negative.    Psychiatric/Behavioral: Positive for confusion, decreased concentration and hallucinations. Negative for sleep disturbance.       Objective:      Neurologic Exam      Physical Exam   Constitutional: He appears well-developed and well-nourished.   HENT:   Head: Normocephalic and atraumatic.   Right Ear: Hearing normal.   Left Ear: Hearing normal.   Eyes:   Blind in the right eye secondary to corneal opacity.  EOM full without nystagmus   Neck: Normal range of motion. Neck supple. Carotid bruit is not present.   Neurological: He is alert. He displays abnormal reflex (DTRs generally depressed to absent distally). He displays no atrophy and no tremor. A sensory deficit (primary sensations intact though diminished distally with some hypersensitivity in the feet) is present. No cranial nerve deficit (No facial asymmetry noted with facial movements and sensory exam being normal/symmetrical.  Corneals/gag reflexes normal.  Tongue & palate movements normal.  Hearing unimpaired.  Shoulder shrug was norm). He exhibits normal muscle tone. He displays a negative Romberg sign. Coordination and gait normal. He displays no Babinski's sign on the right side. He displays no Babinski's sign on the left side.   Mental status examination: Patient is oriented to place and grossly to person.  He knows that his sister is with him but has difficulty with recalling the name.  He is not able to give an adequate recent history.  Immediate recall is 0/3 after 1 minute.  Impaired attention span and concentration, and poor judgment and insight.  Language functions are intact with no evidence of aphasia or dysarthria.   Comprehension is unimpaired.  Affect is appropriate, mood was even.  No thought disorder is noted.   Vitals reviewed.        Assessment:        1. Vascular dementia with behavior disturbance    2. Polyneuropathy associated with underlying disease    3. IgA myeloma    4. Essential hypertension            Plan:       Discussed with sister.  The patient has a progressive dementia that could be related to combination of factors including the history of chemotherapy, and an underlying vascular dementia.  He also has a peripheral neuropathy.  Initiate Namenda 5 mg daily in the morning and continue Effexor XR 37.5 mg daily at bedtime.  This may help the behavioral issues as well as symptoms related to the peripheral neuropathy.  He will follow-up in 6 months.

## 2017-09-04 PROBLEM — Z09 FOLLOW-UP EXAMINATION FOLLOWING COMPLETED TREATMENT WITH HIGH-RISK MEDICATIONS, NOT ELSEWHERE CLASSIFIED: Status: RESOLVED | Noted: 2017-05-31 | Resolved: 2017-09-04

## 2017-09-08 ENCOUNTER — HOSPITAL ENCOUNTER (EMERGENCY)
Facility: HOSPITAL | Age: 82
Discharge: HOME | End: 2017-09-08
Attending: EMERGENCY MEDICINE
Payer: MEDICARE

## 2017-09-08 VITALS
SYSTOLIC BLOOD PRESSURE: 115 MMHG | DIASTOLIC BLOOD PRESSURE: 76 MMHG | WEIGHT: 140 LBS | OXYGEN SATURATION: 99 % | RESPIRATION RATE: 20 BRPM | TEMPERATURE: 97 F | BODY MASS INDEX: 24.03 KG/M2 | HEART RATE: 68 BPM

## 2017-09-08 DIAGNOSIS — F01.518 VASCULAR DEMENTIA WITH BEHAVIOR DISTURBANCE: Primary | ICD-10-CM

## 2017-09-08 DIAGNOSIS — R63.0 ANOREXIA: ICD-10-CM

## 2017-09-08 LAB
ALBUMIN SERPL BCP-MCNC: 3.4 G/DL
ALP SERPL-CCNC: 79 U/L
ALT SERPL W/O P-5'-P-CCNC: 11 U/L
ANION GAP SERPL CALC-SCNC: 15 MMOL/L
ANISOCYTOSIS BLD QL SMEAR: SLIGHT
APTT BLDCRRT: 32.8 SEC
AST SERPL-CCNC: 23 U/L
BASOPHILS # BLD AUTO: ABNORMAL K/UL
BASOPHILS NFR BLD: 0 %
BILIRUB SERPL-MCNC: 0.6 MG/DL
BILIRUB UR QL STRIP: NEGATIVE
BUN SERPL-MCNC: 18 MG/DL
CALCIUM SERPL-MCNC: 9.9 MG/DL
CHLORIDE SERPL-SCNC: 103 MMOL/L
CLARITY UR: CLEAR
CO2 SERPL-SCNC: 22 MMOL/L
COLOR UR: ABNORMAL
CORTIS SERPL-MCNC: 15 UG/DL
CREAT SERPL-MCNC: 1.4 MG/DL
DIFFERENTIAL METHOD: ABNORMAL
EOSINOPHIL # BLD AUTO: ABNORMAL K/UL
EOSINOPHIL NFR BLD: 0 %
ERYTHROCYTE [DISTWIDTH] IN BLOOD BY AUTOMATED COUNT: 15.1 %
EST. GFR  (AFRICAN AMERICAN): 54 ML/MIN/1.73 M^2
EST. GFR  (NON AFRICAN AMERICAN): 47 ML/MIN/1.73 M^2
GLUCOSE SERPL-MCNC: 98 MG/DL
GLUCOSE UR QL STRIP: NEGATIVE
HCT VFR BLD AUTO: 33.2 %
HGB BLD-MCNC: 11.1 G/DL
HGB UR QL STRIP: NEGATIVE
HYPOCHROMIA BLD QL SMEAR: ABNORMAL
KETONES UR QL STRIP: NEGATIVE
LACTATE SERPL-SCNC: 1.8 MMOL/L
LEUKOCYTE ESTERASE UR QL STRIP: NEGATIVE
LYMPHOCYTES # BLD AUTO: ABNORMAL K/UL
LYMPHOCYTES NFR BLD: 25 %
MCH RBC QN AUTO: 32.4 PG
MCHC RBC AUTO-ENTMCNC: 33.4 G/DL
MCV RBC AUTO: 97 FL
MONOCYTES # BLD AUTO: ABNORMAL K/UL
MONOCYTES NFR BLD: 9 %
NEUTROPHILS NFR BLD: 66 %
NITRITE UR QL STRIP: NEGATIVE
PH UR STRIP: 6 [PH] (ref 5–8)
PLATELET # BLD AUTO: 178 K/UL
PLATELET BLD QL SMEAR: ABNORMAL
PMV BLD AUTO: 9.2 FL
POTASSIUM SERPL-SCNC: 3.7 MMOL/L
PROT SERPL-MCNC: 9.8 G/DL
PROT UR QL STRIP: NEGATIVE
RBC # BLD AUTO: 3.43 M/UL
SODIUM SERPL-SCNC: 140 MMOL/L
SP GR UR STRIP: 1.02 (ref 1–1.03)
TROPONIN I SERPL DL<=0.01 NG/ML-MCNC: <0.006 NG/ML
URN SPEC COLLECT METH UR: ABNORMAL
UROBILINOGEN UR STRIP-ACNC: 1 EU/DL
WBC # BLD AUTO: 7.63 K/UL

## 2017-09-08 PROCEDURE — 87040 BLOOD CULTURE FOR BACTERIA: CPT

## 2017-09-08 PROCEDURE — 85730 THROMBOPLASTIN TIME PARTIAL: CPT

## 2017-09-08 PROCEDURE — 85007 BL SMEAR W/DIFF WBC COUNT: CPT

## 2017-09-08 PROCEDURE — 87088 URINE BACTERIA CULTURE: CPT

## 2017-09-08 PROCEDURE — 84484 ASSAY OF TROPONIN QUANT: CPT

## 2017-09-08 PROCEDURE — 87086 URINE CULTURE/COLONY COUNT: CPT

## 2017-09-08 PROCEDURE — 93005 ELECTROCARDIOGRAM TRACING: CPT

## 2017-09-08 PROCEDURE — 87186 SC STD MICRODIL/AGAR DIL: CPT

## 2017-09-08 PROCEDURE — 63600175 PHARM REV CODE 636 W HCPCS: Performed by: EMERGENCY MEDICINE

## 2017-09-08 PROCEDURE — 99285 EMERGENCY DEPT VISIT HI MDM: CPT | Mod: 25

## 2017-09-08 PROCEDURE — 82533 TOTAL CORTISOL: CPT

## 2017-09-08 PROCEDURE — 85027 COMPLETE CBC AUTOMATED: CPT

## 2017-09-08 PROCEDURE — 81003 URINALYSIS AUTO W/O SCOPE: CPT

## 2017-09-08 PROCEDURE — 25000003 PHARM REV CODE 250: Performed by: EMERGENCY MEDICINE

## 2017-09-08 PROCEDURE — 80053 COMPREHEN METABOLIC PANEL: CPT

## 2017-09-08 PROCEDURE — 96374 THER/PROPH/DIAG INJ IV PUSH: CPT

## 2017-09-08 PROCEDURE — 83605 ASSAY OF LACTIC ACID: CPT

## 2017-09-08 PROCEDURE — 93010 ELECTROCARDIOGRAM REPORT: CPT | Mod: ,,, | Performed by: INTERNAL MEDICINE

## 2017-09-08 PROCEDURE — 87077 CULTURE AEROBIC IDENTIFY: CPT

## 2017-09-08 RX ORDER — MIDAZOLAM HYDROCHLORIDE 1 MG/ML
1 INJECTION INTRAMUSCULAR; INTRAVENOUS
Status: COMPLETED | OUTPATIENT
Start: 2017-09-08 | End: 2017-09-08

## 2017-09-08 RX ORDER — MIDAZOLAM HYDROCHLORIDE 5 MG/ML
1 INJECTION INTRAMUSCULAR; INTRAVENOUS
Status: DISCONTINUED | OUTPATIENT
Start: 2017-09-08 | End: 2017-09-08

## 2017-09-08 RX ORDER — MIDAZOLAM HYDROCHLORIDE 1 MG/ML
1 INJECTION INTRAMUSCULAR; INTRAVENOUS
Status: DISCONTINUED | OUTPATIENT
Start: 2017-09-08 | End: 2017-09-08

## 2017-09-08 RX ADMIN — MIDAZOLAM HYDROCHLORIDE 1 MG: 1 INJECTION, SOLUTION INTRAMUSCULAR; INTRAVENOUS at 09:09

## 2017-09-08 RX ADMIN — SODIUM CHLORIDE 1905 ML: 0.9 INJECTION, SOLUTION INTRAVENOUS at 07:09

## 2017-09-08 NOTE — ED PROVIDER NOTES
Encounter Date: 9/8/2017       History     Chief Complaint   Patient presents with    Fatigue     EMS states pt was brought to ED for generalized weakness.  EMS states family denies change in mental status.  Pt begame combative in route.      81-year-old male presents to the emergency department I EMS, with concern for increasing fatigue and weakness.  He has a history of vascular dementia, as well as IgA myeloma.  According to his wife, he has been urinating on himself for the last few weeks, and is become increasingly more weak.  This morning, he normally walks by himself sometimes with the assistance of a cane when his knee is bothering him but this morning he has been refusing to walk.  No history of falls.  No history of fevers nausea or vomiting, but this morning he did not look for food, which his wife reports he is normally always asking for food.  No history of bowel incontinence, but has been urinating on himself for several weeks now.  Apparently by EMS he was coming increasingly agitated and had arm restraints.  His wife denies any behavioral changes.        The history is provided by the EMS personnel and a relative. The history is limited by the condition of the patient.     Review of patient's allergies indicates:  No Known Allergies  Past Medical History:   Diagnosis Date    Anemia     Back pain     BPH (benign prostatic hypertrophy)     Chronic constipation     Colon polyp     Diverticulosis     GERD (gastroesophageal reflux disease)     Glaucoma (increased eye pressure)     Hepatitis C     Hypertension     Kidney stone     Myeloma     Urinary tract infection      Past Surgical History:   Procedure Laterality Date    carotid endarterectomy      left    COLON SURGERY       Family History   Problem Relation Age of Onset    Prostate cancer Neg Hx     Kidney disease Neg Hx      Social History   Substance Use Topics    Smoking status: Former Smoker     Packs/day: 0.25     Quit date:  7/19/1982    Smokeless tobacco: Never Used    Alcohol use No     Review of Systems   Unable to perform ROS: Mental status change   Constitutional: Positive for fatigue. Negative for fever.   Eyes: Negative.    Endocrine: Negative.    Genitourinary: Positive for frequency.   All other systems reviewed and are negative.      Physical Exam     Initial Vitals [09/08/17 1815]   BP Pulse Resp Temp SpO2   118/72 (!) 58 16 97.4 °F (36.3 °C) 100 %      MAP       87.33         Physical Exam    Nursing note and vitals reviewed.  Constitutional:   Chronically ill appearing   HENT:   Head: Normocephalic.   Eyes: Pupils are equal, round, and reactive to light.   Blind from R eye   Neck: Normal range of motion.   Cardiovascular: Normal rate.   Pulmonary/Chest: Breath sounds normal.   Abdominal: Soft. Bowel sounds are normal.   Tense, but non painful   Musculoskeletal: Normal range of motion.   He is moving all extremities  He holds his legs up for 5 seconds. Poor memory and recall  Tells me his name and where he's located  Can't remember his medications  Gets agitated when taking his clothes off   Neurological: He is alert and oriented to person, place, and time.   Skin: Skin is warm and dry.   Psychiatric: His speech is tangential. He is agitated and combative. He is not actively hallucinating. Cognition and memory are impaired. He exhibits abnormal recent memory and abnormal remote memory. He is inattentive.         ED Course   Procedures  Labs Reviewed   APTT - Abnormal; Notable for the following:        Result Value    aPTT 32.8 (*)     All other components within normal limits   CBC W/ AUTO DIFFERENTIAL - Abnormal; Notable for the following:     RBC 3.43 (*)     Hemoglobin 11.1 (*)     Hematocrit 33.2 (*)     MCH 32.4 (*)     RDW 15.1 (*)     All other components within normal limits   COMPREHENSIVE METABOLIC PANEL - Abnormal; Notable for the following:     CO2 22 (*)     Total Protein 9.8 (*)     Albumin 3.4 (*)     eGFR  if  54 (*)     eGFR if non  47 (*)     All other components within normal limits   URINALYSIS - Abnormal; Notable for the following:     Color, UA Brown (*)     All other components within normal limits   CULTURE, BLOOD   CULTURE, BLOOD   CULTURE, URINE   LACTIC ACID, PLASMA   TROPONIN I   CORTISOL, RANDOM   LACTIC ACID, PLASMA     EKG Readings: (Independently Interpreted)   Initial Reading: No STEMI. Previous EKG: Compared with most recent EKG   EKG shows persistence of ST depression in inferior leads,       X-Rays:   Independently Interpreted Readings:   Other Readings:  Chest Xray was independently reviewed by me; I agree with radiologist interpretation.       Medical Decision Making:   Initial Assessment:   81-year-old male with a history of vascular dementia and behavioral disturbances here by wife complaining that he is weak.  She reports his behavior is at his baseline.  Differential Diagnosis:   Organ dysfunction, anemia, infection, electrolyte derangement, ICH    Clinical Tests:   Lab Tests: Ordered and Reviewed  ED Management:  Patient was given IV fluids.  He was given a small dose of Versed to allow for procedures.  According to his wife this is his baseline with his history of vascular dementia.  His blood work, and imaging are all unremarkable.  He has no evidence of dehydration or ketosis.  He has been ambulatory, and is eating and drinking.  He was discharged back home with a diagnosis of vascular dementia. She was instructed to bring her  to return to the ED for worsening symptoms.                   ED Course as of Sep 08 2243   Fri Sep 08, 2017   2125 Blood work unremarkable, no ketones. He is not interested in eating. Will get KUB, given unreliable historian.  [MG]   2230 Awaiting KUB and PO challenge. He has been up and walking  [MG]      ED Course User Index  [MG] Ping Linder MD     Clinical Impression:   The primary encounter diagnosis was Vascular  dementia with behavior disturbance. A diagnosis of Anorexia was also pertinent to this visit.                           Ping Linder MD  09/08/17 8149

## 2017-09-09 NOTE — ED NOTES
Patient with history of alzheimers reports to ed with c/o generalized weakness. Family serves as historian. Reports that today, pt was unable to walk which is a change from his baseline. Also reports recent episodes of fecal and urinary incontinence not typical to patient's usual behavior. At present, patient is disoriented x4. Presents in wrist restraints as was apparently combative to EMS.

## 2017-09-10 LAB — BACTERIA UR CULT: NORMAL

## 2017-09-11 RX ORDER — VENLAFAXINE HYDROCHLORIDE 37.5 MG/1
37.5 CAPSULE, EXTENDED RELEASE ORAL DAILY
Qty: 30 CAPSULE | Refills: 3 | Status: ON HOLD | OUTPATIENT
Start: 2017-09-11 | End: 2017-12-27 | Stop reason: HOSPADM

## 2017-09-13 LAB — BACTERIA BLD CULT: NORMAL

## 2017-09-22 ENCOUNTER — HOSPITAL ENCOUNTER (INPATIENT)
Facility: HOSPITAL | Age: 82
LOS: 3 days | Discharge: HOME | DRG: 871 | End: 2017-09-25
Attending: EMERGENCY MEDICINE | Admitting: INTERNAL MEDICINE
Payer: MEDICARE

## 2017-09-22 DIAGNOSIS — N39.0 UTI (URINARY TRACT INFECTION): ICD-10-CM

## 2017-09-22 DIAGNOSIS — N30.00 ACUTE CYSTITIS WITHOUT HEMATURIA: ICD-10-CM

## 2017-09-22 DIAGNOSIS — A41.9 SEPSIS, DUE TO UNSPECIFIED ORGANISM: Primary | ICD-10-CM

## 2017-09-22 DIAGNOSIS — F01.50 VASCULAR DEMENTIA WITHOUT BEHAVIORAL DISTURBANCE: ICD-10-CM

## 2017-09-22 DIAGNOSIS — G93.40 ACUTE ENCEPHALOPATHY: ICD-10-CM

## 2017-09-22 DIAGNOSIS — R41.82 ALTERED MENTAL STATUS: ICD-10-CM

## 2017-09-22 DIAGNOSIS — N39.0 URINARY TRACT INFECTION WITHOUT HEMATURIA, SITE UNSPECIFIED: ICD-10-CM

## 2017-09-22 PROBLEM — R65.10 SIRS (SYSTEMIC INFLAMMATORY RESPONSE SYNDROME): Status: ACTIVE | Noted: 2017-09-22

## 2017-09-22 LAB
ALBUMIN SERPL BCP-MCNC: 3.2 G/DL
ALP SERPL-CCNC: 85 U/L
ALT SERPL W/O P-5'-P-CCNC: 9 U/L
ANION GAP SERPL CALC-SCNC: 15 MMOL/L
AST SERPL-CCNC: 19 U/L
BACTERIA #/AREA URNS HPF: ABNORMAL /HPF
BASOPHILS # BLD AUTO: 0 K/UL
BASOPHILS NFR BLD: 0 %
BILIRUB SERPL-MCNC: 0.6 MG/DL
BILIRUB UR QL STRIP: NEGATIVE
BUN SERPL-MCNC: 13 MG/DL
CALCIUM SERPL-MCNC: 10 MG/DL
CHLORIDE SERPL-SCNC: 102 MMOL/L
CK SERPL-CCNC: 41 U/L
CLARITY UR: ABNORMAL
CO2 SERPL-SCNC: 20 MMOL/L
COLOR UR: YELLOW
CREAT SERPL-MCNC: 1.1 MG/DL
DIFFERENTIAL METHOD: ABNORMAL
EOSINOPHIL # BLD AUTO: 0 K/UL
EOSINOPHIL NFR BLD: 0.1 %
ERYTHROCYTE [DISTWIDTH] IN BLOOD BY AUTOMATED COUNT: 14.9 %
EST. GFR  (AFRICAN AMERICAN): >60 ML/MIN/1.73 M^2
EST. GFR  (NON AFRICAN AMERICAN): >60 ML/MIN/1.73 M^2
FLUAV AG SPEC QL IA: NEGATIVE
FLUBV AG SPEC QL IA: NEGATIVE
GLUCOSE SERPL-MCNC: 131 MG/DL
GLUCOSE UR QL STRIP: ABNORMAL
GRAM STN SPEC: NORMAL
GRAM STN SPEC: NORMAL
HCT VFR BLD AUTO: 33.8 %
HGB BLD-MCNC: 11.1 G/DL
HGB UR QL STRIP: ABNORMAL
KETONES UR QL STRIP: NEGATIVE
LACTATE SERPL-SCNC: 1.8 MMOL/L
LEUKOCYTE ESTERASE UR QL STRIP: ABNORMAL
LYMPHOCYTES # BLD AUTO: 1.2 K/UL
LYMPHOCYTES NFR BLD: 15.3 %
MAGNESIUM SERPL-MCNC: 2.1 MG/DL
MCH RBC QN AUTO: 32.1 PG
MCHC RBC AUTO-ENTMCNC: 32.8 G/DL
MCV RBC AUTO: 98 FL
MICROSCOPIC COMMENT: ABNORMAL
MONOCYTES # BLD AUTO: 0.9 K/UL
MONOCYTES NFR BLD: 11.4 %
NEUTROPHILS # BLD AUTO: 5.7 K/UL
NEUTROPHILS NFR BLD: 73.1 %
NITRITE UR QL STRIP: POSITIVE
PH UR STRIP: 6 [PH] (ref 5–8)
PLATELET # BLD AUTO: 227 K/UL
PMV BLD AUTO: 8.6 FL
POCT GLUCOSE: 134 MG/DL (ref 70–110)
POTASSIUM SERPL-SCNC: 4 MMOL/L
PROT SERPL-MCNC: 9.5 G/DL
PROT UR QL STRIP: ABNORMAL
RBC # BLD AUTO: 3.46 M/UL
RBC #/AREA URNS HPF: 5 /HPF (ref 0–4)
SODIUM SERPL-SCNC: 137 MMOL/L
SP GR UR STRIP: 1.01 (ref 1–1.03)
SPECIMEN SOURCE: NORMAL
SQUAMOUS #/AREA URNS HPF: 0 /HPF
TROPONIN I SERPL DL<=0.01 NG/ML-MCNC: <0.006 NG/ML
URN SPEC COLLECT METH UR: ABNORMAL
UROBILINOGEN UR STRIP-ACNC: ABNORMAL EU/DL
WBC # BLD AUTO: 7.78 K/UL
WBC #/AREA URNS HPF: >100 /HPF (ref 0–5)

## 2017-09-22 PROCEDURE — 96367 TX/PROPH/DG ADDL SEQ IV INF: CPT

## 2017-09-22 PROCEDURE — 93010 ELECTROCARDIOGRAM REPORT: CPT | Mod: ,,, | Performed by: INTERNAL MEDICINE

## 2017-09-22 PROCEDURE — 87186 SC STD MICRODIL/AGAR DIL: CPT

## 2017-09-22 PROCEDURE — 25000003 PHARM REV CODE 250: Performed by: EMERGENCY MEDICINE

## 2017-09-22 PROCEDURE — 82962 GLUCOSE BLOOD TEST: CPT

## 2017-09-22 PROCEDURE — 96375 TX/PRO/DX INJ NEW DRUG ADDON: CPT

## 2017-09-22 PROCEDURE — 87088 URINE BACTERIA CULTURE: CPT

## 2017-09-22 PROCEDURE — 96365 THER/PROPH/DIAG IV INF INIT: CPT

## 2017-09-22 PROCEDURE — 85025 COMPLETE CBC W/AUTO DIFF WBC: CPT | Mod: 91

## 2017-09-22 PROCEDURE — 80053 COMPREHEN METABOLIC PANEL: CPT | Mod: 91

## 2017-09-22 PROCEDURE — 84484 ASSAY OF TROPONIN QUANT: CPT

## 2017-09-22 PROCEDURE — 99285 EMERGENCY DEPT VISIT HI MDM: CPT | Mod: 25

## 2017-09-22 PROCEDURE — G0378 HOSPITAL OBSERVATION PER HR: HCPCS

## 2017-09-22 PROCEDURE — 81000 URINALYSIS NONAUTO W/SCOPE: CPT

## 2017-09-22 PROCEDURE — 83735 ASSAY OF MAGNESIUM: CPT

## 2017-09-22 PROCEDURE — 87040 BLOOD CULTURE FOR BACTERIA: CPT | Mod: 59

## 2017-09-22 PROCEDURE — 63600175 PHARM REV CODE 636 W HCPCS: Performed by: EMERGENCY MEDICINE

## 2017-09-22 PROCEDURE — 83605 ASSAY OF LACTIC ACID: CPT

## 2017-09-22 PROCEDURE — 87086 URINE CULTURE/COLONY COUNT: CPT

## 2017-09-22 PROCEDURE — 87400 INFLUENZA A/B EACH AG IA: CPT

## 2017-09-22 PROCEDURE — 93005 ELECTROCARDIOGRAM TRACING: CPT

## 2017-09-22 PROCEDURE — 82550 ASSAY OF CK (CPK): CPT

## 2017-09-22 PROCEDURE — 96366 THER/PROPH/DIAG IV INF ADDON: CPT

## 2017-09-22 PROCEDURE — 87077 CULTURE AEROBIC IDENTIFY: CPT

## 2017-09-22 PROCEDURE — 87205 SMEAR GRAM STAIN: CPT

## 2017-09-22 RX ORDER — IBUPROFEN 600 MG/1
600 TABLET ORAL EVERY 8 HOURS PRN
Status: DISCONTINUED | OUTPATIENT
Start: 2017-09-23 | End: 2017-09-25 | Stop reason: HOSPADM

## 2017-09-22 RX ORDER — ONDANSETRON 2 MG/ML
4 INJECTION INTRAMUSCULAR; INTRAVENOUS
Status: COMPLETED | OUTPATIENT
Start: 2017-09-22 | End: 2017-09-22

## 2017-09-22 RX ORDER — CEFEPIME HYDROCHLORIDE 2 G/50ML
2 INJECTION, SOLUTION INTRAVENOUS
Status: COMPLETED | OUTPATIENT
Start: 2017-09-22 | End: 2017-09-22

## 2017-09-22 RX ORDER — ACETAMINOPHEN 650 MG/1
650 SUPPOSITORY RECTAL
Status: COMPLETED | OUTPATIENT
Start: 2017-09-22 | End: 2017-09-22

## 2017-09-22 RX ADMIN — SODIUM CHLORIDE 1000 ML: 0.9 INJECTION, SOLUTION INTRAVENOUS at 09:09

## 2017-09-22 RX ADMIN — VANCOMYCIN HYDROCHLORIDE 1250 MG: 100 INJECTION, POWDER, LYOPHILIZED, FOR SOLUTION INTRAVENOUS at 09:09

## 2017-09-22 RX ADMIN — CEFEPIME HYDROCHLORIDE 2 G: 2 INJECTION, SOLUTION INTRAVENOUS at 09:09

## 2017-09-22 RX ADMIN — ONDANSETRON 4 MG: 2 INJECTION INTRAMUSCULAR; INTRAVENOUS at 07:09

## 2017-09-22 RX ADMIN — ACETAMINOPHEN 650 MG: 650 SUPPOSITORY RECTAL at 09:09

## 2017-09-22 RX ADMIN — IBUPROFEN 600 MG: 600 TABLET, FILM COATED ORAL at 11:09

## 2017-09-22 NOTE — ED PROVIDER NOTES
Encounter Date: 9/22/2017       History     Chief Complaint   Patient presents with    Altered Mental Status     altered mental status this evening. pt was found outside in a chair difficult to arouse. pt is awake and alert at this time. states taht eh feels fine. reports productive cough with yellow sputum     Patient is an 81-year-old male with vascular dementia brought in by EMS.  Patient was sitting outside in the chair and according to his wife was difficult to arouse.  She also says her  has been coughing up yellow sputum.  No known fever.  No vomiting.  Patient presents to the ED awake with no complaints.      The history is provided by the spouse and the EMS personnel.     Review of patient's allergies indicates:  No Known Allergies  Past Medical History:   Diagnosis Date    Anemia     Back pain     BPH (benign prostatic hypertrophy)     Chronic constipation     Colon polyp     Diverticulosis     GERD (gastroesophageal reflux disease)     Glaucoma (increased eye pressure)     Hepatitis C     Hypertension     Kidney stone     Myeloma     Urinary tract infection      Past Surgical History:   Procedure Laterality Date    carotid endarterectomy      left    COLON SURGERY       Family History   Problem Relation Age of Onset    Prostate cancer Neg Hx     Kidney disease Neg Hx      Social History   Substance Use Topics    Smoking status: Former Smoker     Packs/day: 0.25     Quit date: 7/19/1982    Smokeless tobacco: Never Used    Alcohol use No     Review of Systems   Unable to perform ROS: Dementia       Physical Exam     Initial Vitals [09/22/17 1637]   BP Pulse Resp Temp SpO2   124/62 69 18 99.1 °F (37.3 °C) (!) 94 %      MAP       82.67         Physical Exam    Nursing note and vitals reviewed.  Constitutional: No distress.   HENT:   Head: Atraumatic.   Eyes:   Right corneal clouding.   Neck: Normal range of motion. Neck supple.   Cardiovascular: Normal rate, regular rhythm and  normal heart sounds.   Pulmonary/Chest:   Diminished breath sounds bilaterally.  (Poor inspiratory effort.)   Abdominal: Soft. He exhibits no distension. There is no tenderness.   Musculoskeletal: He exhibits no edema.   Moves all extremities.   Skin: Skin is warm and dry.         ED Course   Procedures  Labs Reviewed   POCT GLUCOSE - Abnormal; Notable for the following:        Result Value    POCT Glucose 134 (*)     All other components within normal limits     EKG Readings: (Independently Interpreted)   Rhythm: Normal Sinus Rhythm. Heart Rate: 75. ST Segments: Normal ST Segments. T Waves: Normal.                            ED Course as of Sep 23 0959   Fri Sep 22, 2017   1917 Troponin I: <0.006 [NP]   1917 POCT Glucose: (!) 134 [NP]   1917 WBC: 7.78 [NP]   1917 Hemoglobin: (!) 11.1 [NP]   1917 Hematocrit: (!) 33.8 [NP]   1917 Platelets: 227 [NP]   1917 Sodium: 137 [NP]   1917 Potassium: 4.0 [NP]   1917 Chloride: 102 [NP]   1917 CO2: (!) 20 [NP]   1917 Glucose: (!) 131 [NP]   1917 BUN, Bld: 13 [NP]   1917 Creatinine: 1.1 [NP]   1917 Pt vomited. Will order zofran  [NP]   1927 CPK: 41 [NP]   1927 Magnesium: 2.1 [NP]   2034 Temp: (!) 102.9 °F (39.4 °C) [NP]   2034 Pulse: 103 [NP]   2034 SpO2: (!) 93 % [NP]   2130 WBC, UA: (!) >100 [NP]   2130 Bacteria, UA: (!) Many [NP]      ED Course User Index  [NP] Riki Jarquin MD     Clinical Impression:   The primary encounter diagnosis was Sepsis, due to unspecified organism. Diagnoses of Vascular dementia without behavioral disturbance, Altered mental status, Urinary tract infection without hematuria, site unspecified, and UTI (urinary tract infection) were also pertinent to this visit.                           Avelino Jackson MD  09/23/17 1001

## 2017-09-22 NOTE — ED NOTES
Patient has verified the spelling of their name and  on armband  LOC: The patient is awake, alert, aware of last name and month and date of birth, not aware time or place  APPEARANCE: Patient resting comfortably and in no acute distress  SKIN: The skin is warm and dry, color consistent with ethnicity   :   MUSCULOSKELETAL: Patient moving lower extrem, no obvious swelling or deformities noted, .   RESPIRATORY: Airway is open and patent, respirations are spontaneous, patient has a normal effort and rate, no accessory muscle use noted, bilateral breath sounds clear  ABDOMEN: Soft and non tender to palpation, no distention noted, normoactive bowel sounds present in all four quadrants.   CARDIAC: Normal rate and rhythm, no peripheral edema noted, less then 3 second capillary refill, denies chest pain  COMPLAINT:pt found on porch altered mental status per ems, pt was found by officer, officer had difficulty arousing pt had to perform sternal rub

## 2017-09-23 PROBLEM — F01.50 VASCULAR DEMENTIA WITHOUT BEHAVIORAL DISTURBANCE: Status: ACTIVE | Noted: 2017-06-30

## 2017-09-23 LAB
ALBUMIN SERPL BCP-MCNC: 2.7 G/DL
ALP SERPL-CCNC: 71 U/L
ALT SERPL W/O P-5'-P-CCNC: 9 U/L
ANION GAP SERPL CALC-SCNC: 10 MMOL/L
AST SERPL-CCNC: 24 U/L
BASOPHILS # BLD AUTO: 0.01 K/UL
BASOPHILS NFR BLD: 0.1 %
BILIRUB SERPL-MCNC: 0.7 MG/DL
BUN SERPL-MCNC: 12 MG/DL
CALCIUM SERPL-MCNC: 9.4 MG/DL
CHLORIDE SERPL-SCNC: 104 MMOL/L
CO2 SERPL-SCNC: 24 MMOL/L
CREAT SERPL-MCNC: 1 MG/DL
DIFFERENTIAL METHOD: ABNORMAL
EOSINOPHIL # BLD AUTO: 0 K/UL
EOSINOPHIL NFR BLD: 0 %
ERYTHROCYTE [DISTWIDTH] IN BLOOD BY AUTOMATED COUNT: 14.9 %
EST. GFR  (AFRICAN AMERICAN): >60 ML/MIN/1.73 M^2
EST. GFR  (NON AFRICAN AMERICAN): >60 ML/MIN/1.73 M^2
GLUCOSE SERPL-MCNC: 100 MG/DL
HCT VFR BLD AUTO: 29.8 %
HGB BLD-MCNC: 9.9 G/DL
LACTATE SERPL-SCNC: 2 MMOL/L
LYMPHOCYTES # BLD AUTO: 1.8 K/UL
LYMPHOCYTES NFR BLD: 14.2 %
MCH RBC QN AUTO: 32.4 PG
MCHC RBC AUTO-ENTMCNC: 33.2 G/DL
MCV RBC AUTO: 97 FL
MONOCYTES # BLD AUTO: 1.2 K/UL
MONOCYTES NFR BLD: 8.9 %
NEUTROPHILS # BLD AUTO: 9.9 K/UL
NEUTROPHILS NFR BLD: 76.8 %
PLATELET # BLD AUTO: 220 K/UL
PMV BLD AUTO: 9 FL
POTASSIUM SERPL-SCNC: 4.1 MMOL/L
PROT SERPL-MCNC: 8.3 G/DL
RBC # BLD AUTO: 3.06 M/UL
SODIUM SERPL-SCNC: 138 MMOL/L
WBC # BLD AUTO: 12.87 K/UL

## 2017-09-23 PROCEDURE — G8996 SWALLOW CURRENT STATUS: HCPCS | Mod: CK

## 2017-09-23 PROCEDURE — 94761 N-INVAS EAR/PLS OXIMETRY MLT: CPT

## 2017-09-23 PROCEDURE — 36415 COLL VENOUS BLD VENIPUNCTURE: CPT

## 2017-09-23 PROCEDURE — 63600175 PHARM REV CODE 636 W HCPCS: Performed by: NURSE PRACTITIONER

## 2017-09-23 PROCEDURE — 63600175 PHARM REV CODE 636 W HCPCS: Performed by: STUDENT IN AN ORGANIZED HEALTH CARE EDUCATION/TRAINING PROGRAM

## 2017-09-23 PROCEDURE — G8997 SWALLOW GOAL STATUS: HCPCS | Mod: CJ

## 2017-09-23 PROCEDURE — 80053 COMPREHEN METABOLIC PANEL: CPT

## 2017-09-23 PROCEDURE — 11000001 HC ACUTE MED/SURG PRIVATE ROOM

## 2017-09-23 PROCEDURE — 25000003 PHARM REV CODE 250: Performed by: NURSE PRACTITIONER

## 2017-09-23 PROCEDURE — 83605 ASSAY OF LACTIC ACID: CPT | Mod: 91

## 2017-09-23 PROCEDURE — A4216 STERILE WATER/SALINE, 10 ML: HCPCS | Performed by: NURSE PRACTITIONER

## 2017-09-23 PROCEDURE — 92610 EVALUATE SWALLOWING FUNCTION: CPT

## 2017-09-23 PROCEDURE — 85025 COMPLETE CBC W/AUTO DIFF WBC: CPT

## 2017-09-23 RX ORDER — VENLAFAXINE HYDROCHLORIDE 37.5 MG/1
37.5 CAPSULE, EXTENDED RELEASE ORAL DAILY
Status: DISCONTINUED | OUTPATIENT
Start: 2017-09-23 | End: 2017-09-25 | Stop reason: HOSPADM

## 2017-09-23 RX ORDER — DORZOLAMIDE HYDROCHLORIDE AND TIMOLOL MALEATE 20; 5 MG/ML; MG/ML
1 SOLUTION/ DROPS OPHTHALMIC 2 TIMES DAILY
Status: DISCONTINUED | OUTPATIENT
Start: 2017-09-23 | End: 2017-09-23

## 2017-09-23 RX ORDER — MEMANTINE HYDROCHLORIDE 5 MG/1
5 TABLET ORAL 2 TIMES DAILY
Status: DISCONTINUED | OUTPATIENT
Start: 2017-09-23 | End: 2017-09-25 | Stop reason: HOSPADM

## 2017-09-23 RX ORDER — TIMOLOL MALEATE 5 MG/ML
1 SOLUTION/ DROPS OPHTHALMIC 2 TIMES DAILY
Status: DISCONTINUED | OUTPATIENT
Start: 2017-09-23 | End: 2017-09-25 | Stop reason: HOSPADM

## 2017-09-23 RX ORDER — HALOPERIDOL 5 MG/ML
10 INJECTION INTRAMUSCULAR ONCE
Status: COMPLETED | OUTPATIENT
Start: 2017-09-23 | End: 2017-09-23

## 2017-09-23 RX ORDER — SODIUM CHLORIDE 0.9 % (FLUSH) 0.9 %
3 SYRINGE (ML) INJECTION EVERY 8 HOURS
Status: DISCONTINUED | OUTPATIENT
Start: 2017-09-23 | End: 2017-09-25 | Stop reason: HOSPADM

## 2017-09-23 RX ORDER — HALOPERIDOL 5 MG/ML
10 INJECTION INTRAMUSCULAR ONCE
Status: DISCONTINUED | OUTPATIENT
Start: 2017-09-23 | End: 2017-09-23

## 2017-09-23 RX ORDER — TAMSULOSIN HYDROCHLORIDE 0.4 MG/1
0.4 CAPSULE ORAL DAILY
Status: DISCONTINUED | OUTPATIENT
Start: 2017-09-23 | End: 2017-09-25 | Stop reason: HOSPADM

## 2017-09-23 RX ORDER — DORZOLAMIDE HCL 20 MG/ML
1 SOLUTION/ DROPS OPHTHALMIC 2 TIMES DAILY
Status: DISCONTINUED | OUTPATIENT
Start: 2017-09-23 | End: 2017-09-25 | Stop reason: HOSPADM

## 2017-09-23 RX ORDER — LATANOPROST 50 UG/ML
1 SOLUTION/ DROPS OPHTHALMIC NIGHTLY
Status: DISCONTINUED | OUTPATIENT
Start: 2017-09-23 | End: 2017-09-25 | Stop reason: HOSPADM

## 2017-09-23 RX ADMIN — DORZOLAMIDE HYDROCHLORIDE 1 DROP: 20 SOLUTION/ DROPS OPHTHALMIC at 01:09

## 2017-09-23 RX ADMIN — DORZOLAMIDE HYDROCHLORIDE 1 DROP: 20 SOLUTION/ DROPS OPHTHALMIC at 08:09

## 2017-09-23 RX ADMIN — LATANOPROST 1 DROP: 50 SOLUTION OPHTHALMIC at 01:09

## 2017-09-23 RX ADMIN — SODIUM CHLORIDE, PRESERVATIVE FREE 3 ML: 5 INJECTION INTRAVENOUS at 01:09

## 2017-09-23 RX ADMIN — SODIUM CHLORIDE, PRESERVATIVE FREE 3 ML: 5 INJECTION INTRAVENOUS at 07:09

## 2017-09-23 RX ADMIN — LATANOPROST 1 DROP: 50 SOLUTION OPHTHALMIC at 08:09

## 2017-09-23 RX ADMIN — TIMOLOL MALEATE 1 DROP: 5 SOLUTION OPHTHALMIC at 01:09

## 2017-09-23 RX ADMIN — HALOPERIDOL LACTATE 10 MG: 5 INJECTION, SOLUTION INTRAMUSCULAR at 11:09

## 2017-09-23 RX ADMIN — TIMOLOL MALEATE 1 DROP: 5 SOLUTION OPHTHALMIC at 08:09

## 2017-09-23 RX ADMIN — MEMANTINE HYDROCHLORIDE 5 MG: 5 TABLET ORAL at 08:09

## 2017-09-23 RX ADMIN — TAMSULOSIN HYDROCHLORIDE 0.4 MG: 0.4 CAPSULE ORAL at 08:09

## 2017-09-23 RX ADMIN — CEFTRIAXONE 2 G: 2 INJECTION, SOLUTION INTRAVENOUS at 08:09

## 2017-09-23 RX ADMIN — VENLAFAXINE HYDROCHLORIDE 37.5 MG: 37.5 CAPSULE, EXTENDED RELEASE ORAL at 08:09

## 2017-09-23 NOTE — PLAN OF CARE
Problem: Patient Care Overview  Goal: Plan of Care Review  Outcome: Ongoing (interventions implemented as appropriate)  Plan of care reviewed with patient. Patient oriented to self only. IV antibiotics maintained. Patient has had no fevers during shift. Patient able to shift weight frequently to prevent skin breakdown. Skin is clean, dry and intact. Patient sinus bradycardia-NSR on telemetry monitoring, HR 50's-60's, with no true red alarms noted. Bed is low and locked, bed alarm is activated, call light is within reach. Will continue to monitor.

## 2017-09-23 NOTE — PLAN OF CARE
Problem: Patient Care Overview  Goal: Plan of Care Review  Pt seen this AM for bedside swallow eval. Recommendations: 1) puree; 2) thin liquids; 3) HOB raised during and 30 minutes following PO intake; 4) feeding assist.    GOALS    1) Pt will utilize compensatory strategies for safe, efficient swallow of puree/thin at 80% with mod cues.  2) Pt will safely consume 8 of 10 bites of PO trials dental soft without overt s/s of aspiration.     Hilary Friend CCC-SLP  9/23/2017

## 2017-09-23 NOTE — PROVIDER PROGRESS NOTES - EMERGENCY DEPT.
Encounter Date: 9/22/2017    ED Physician Progress Notes       SCRIBE NOTE: I, Kaden Brandt, am scribing for, and in the presence of,  Riki Jarquin MD.  Physician Statement: I, Riki Jarquin MD, personally performed the services described in this documentation as scribed by Kaden Brandt in my presence, and it is both accurate and complete.     Physician Note:   Case accepted from Dr. Nunez at 1800. I agree with previous physician's history/physical and overall assessment.     8:38 PM    GENERALIZED APPEARANCE: not oriented, no sacral ulcers,   VITAL SIGNS:  Per nurse's note, reviewed by me .  SKIN:  Warm, dry; no cyanosis; no rash.  HEAD:  no scalp swelling, no tenderness.  EYES:  no conjunctival pallor, no scleral icterus. Scarred cornea -  right eye  ENMT:  Pharynx:  no erythema; airway patent: no stridor; mucous membranes moist.   NECK:  no tenderness, no stiffness, no JVD  CHEST AND RESPIRATORY:  magdiel, no rhonchi, no wheezes; breath sounds equal bilaterally. Poor inspiratory effort  HEART AND CARDIOVASCULAR:  tachycardic rate, no irregularity;  murmur, no gallop, distal pulses  intact  ABDOMEN AND GI:  Soft; no tenderness, no guarding, no rebound, no palpable masses, no CVA tenderness  EXTREMITIES:  no deformity, no edema, no tenderness  NEURO AND PSYCH:  Pain opens eyes to loud verbal stimulus and manipulation. He does not follow commands.  He is not oriented. Spontaneous movements with all extremities, he is confused but does answer yes/no questions occasionally.    8:43 PM  Initiating sepsis bundle, including hydration at 30 mg/kg, and antibiotics.    9:42 PM Discussed case with Dr. Santino Jane's Nurse Practitioner who will accept admission.    Sepsis, due to unspecified organism  (primary encounter diagnosis)  Vascular dementia without behavioral disturbance  Altered mental status  Urinary tract infection without hematuria, site unspecified    Critical Care time: 35 minutes inclusive of direct patient  care, review of previous records, interpretation of labs, imaging and ekg, as well as discussion of my impression and plan of care with the patient, family and other clinicians/consultants. This time is exclusive of any separate billable procedures and of treating other patients. Critical care was required for this patient who presented with Sepsis requiring my emergent evaluation and management in the emergency department.     I, Dr. Riki Jarquin, personally performed the services described in this documentation. All medical record entries made by the scribe were at my direction and in my presence.  I have reviewed the chart and agree that the record reflects my personal performance and is accurate and complete. Riki Jarquin MD.  9:49 PM 09/22/2017

## 2017-09-23 NOTE — PLAN OF CARE
Problem: Patient Care Overview  Goal: Plan of Care Review  Outcome: Ongoing (interventions implemented as appropriate)  Pt. on room air.  Will cont to monitor      Comments: Pt. on room air.  Will cont to monitor

## 2017-09-23 NOTE — PLAN OF CARE
09/23/17 1211   Discharge Assessment   Assessment Type Discharge Planning Assessment   Confirmed/corrected address and phone number on facesheet? Yes   Assessment information obtained from? Caregiver  (Donna Valenzuela(sister)[phone])   Prior to hospitilization cognitive status: Not Oriented to Place;Not Oriented to Time   Prior to hospitalization functional status: Needs Assistance   Current cognitive status: Not Oriented to Place;Not Oriented to Time   Current Functional Status: Needs Assistance   Facility Arrived From: from home   Lives With sibling(s)   Able to Return to Prior Arrangements unable to determine at this time (comments)   Is patient able to care for self after discharge? Unable to determine at this time (comments)   Who are your caregiver(s) and their phone number(s)? Donna Valenzuela(sister)[phone]/Poly Guerrero(sister)[phone]   Patient's perception of discharge disposition nursing home   Readmission Within The Last 30 Days no previous admission in last 30 days   Patient currently being followed by outpatient case management? No   Patient currently receives any other outside agency services? No   Equipment Currently Used at Home none   Do you have any problems affording any of your prescribed medications? No   Is the patient taking medications as prescribed? yes   Does the patient have transportation home? Yes   Transportation Available family or friend will provide   Does the patient receive [facility] at [facility]? No   Discharge Plan A Home with family   Discharge Plan B New Nursing Home placement - senior living care facility   Patient/Family In Agreement With Plan yes   The Sw spoke to the pt's sister Poly via phone who states she transports the pt to his doctor's appointments but he lives with his sister Donna. Poly states the Donna has been talking about putting the pt in the nh either [facility] or [facility]. She states she assists her sister as needed with the pt.  The Sw spoke to Donna who states she has been considering nhp b/c the pt has been talking all night and urinating all over the floor. She states the choices would be [facility] or [facility] but she states she's not sure if she wants to do it. The Sw informed her the pt can be placed from home for halfway placement. The Sw asked for permission to send the pt's info to the above mentioned nursing homes but Donna declined stating she will call the Sw back if she wants nhp. The pt has 2 children but both live in [place]. No one has POA for the pt. The Sw left her contact info with Donna and encouraged her to call should she want the Sw to proceed with nhp and she stated she will speak with her other siblings before she decides on this b/c she states the pt and her have been living together for over 15 years since her mother passed.

## 2017-09-23 NOTE — H&P
[facility] Internal Medicine History and Physical - Resident Note    Admitting Team: U IM Team B  Attending Physician: Enriqueta  Resident: Olivia  Interns: Mary    Date of Admit: 9/22/2017    Chief Complaint     Altered Mental Status (altered mental status this evening. pt was found outside in a chair difficult to arouse. pt is awake and alert at this time. states taht eh feels fine. reports productive cough with yellow sputum)   for 1-2 days    Subjective:      History of Present Illness:  Mika Valenzuela is a 81 y.o. AA male who  has a past medical history of Anemia; Back pain; BPH (benign prostatic hypertrophy); Chronic constipation; Colon polyp; Diverticulosis; GERD (gastroesophageal reflux disease); Glaucoma (increased eye pressure); Hepatitis C; Hypertension; Kidney stone; Myeloma; and Urinary tract infection.. The patient presented to [facility] on 9/22/2017 with a primary complaint of Altered Mental Status (altered mental status this evening. pt was found outside in a chair difficult to arouse. pt is awake and alert at this time. states taht eh feels fine. reports productive cough with yellow sputum)      The patient was in their usual state of health until day of admission. He does not have any family with him to corroborate story at this time, and he has advanced dementia which is exacerbated 2/2 his acute illness. Per secondary sources, the patient is altered at baseline and became more altered. He has multiple myeloma, IgA gammopathy primarily, and follows with Heme/onc. In the ED he was tachycardic and febrile. He is unable to be aroused to give any further history.     Per clinic notes, he lives with his sister and she is the primary caretaker.     Past Medical History:  Past Medical History:   Diagnosis Date    Anemia     Back pain     BPH (benign prostatic hypertrophy)     Chronic constipation     Colon polyp     Diverticulosis     GERD (gastroesophageal reflux  disease)     Glaucoma (increased eye pressure)     Hepatitis C     Hypertension     Kidney stone     Myeloma     Urinary tract infection        Past Surgical History:  Past Surgical History:   Procedure Laterality Date    carotid endarterectomy      left    COLON SURGERY         Allergies:  Review of patient's allergies indicates:  No Known Allergies    Home Medications:  Prior to Admission medications    Medication Sig Start Date End Date Taking? Authorizing Provider   acyclovir (ZOVIRAX) 400 MG tablet Take 1 tablet (400 mg total) by mouth once daily. 1/6/17   Curly Ellison MD   amlodipine (NORVASC) 10 MG tablet Take 1 tablet (10 mg total) by mouth once daily. 11/21/16   Lily Valladares MD   atenolol (TENORMIN) 50 MG tablet Take 1 tablet (50 mg total) by mouth once daily. 11/21/16   Lily Valladares MD   bortezomib (VELCADE) 3.5 mg injection Inject 1.3 mg/m2 into the skin as directed. Velcade days 1,4,8, and 11 every 28 days    Historical Provider, MD   brimonidine 0.15 % OPTH DROP (ALPHAGAN) 0.15 % ophthalmic solution Place 1 drop into both eyes 2 (two) times daily. 8/13/15   Historical Provider, MD   dexamethasone (DECADRON) 4 MG Tab Take 20 mg by mouth as directed. Take 20mg of Dexamethasone PO with Velcade injections    Historical Provider, MD   difluprednate (DUREZOL) 0.05 % Drop ophthalmic solution Place 1 drop into both eyes 2 (two) times daily.    Historical Provider, MD   dorzolamide-timolol 2-0.5% (COSOPT) 2-0.5 % ophthalmic solution Place 1 drop into both eyes 2 (two) times daily.  7/5/12   Historical Provider, MD   gabapentin (NEURONTIN) 100 MG capsule TAKE 2 CAPSULES EVERY MORNING, 1 CAP MID DAY, THEN 2 CAPS EVERY EVENING 11/21/16   Lily Valladares MD   latanoprost (XALATAN) 0.005 % ophthalmic solution Place 1 drop into both eyes every evening.     Historical Provider, MD   memantine (NAMENDA) 5 MG Tab Take 1 tablet (5 mg total) by mouth once daily. 8/4/17 1/31/18  Conrad Grewal MD    tamsulosin (FLOMAX) 0.4 mg Cp24 Take 1 capsule (0.4 mg total) by mouth once daily. 16  Ming Archer MD   venlafaxine (EFFEXOR-XR) 37.5 MG 24 hr capsule TAKE 1 CAPSULE (37.5 MG TOTAL) BY MOUTH ONCE DAILY. 9/11/17 11/10/17  Conrad Grewal MD       Family History:  Family History   Problem Relation Age of Onset    Prostate cancer Neg Hx     Kidney disease Neg Hx        Social History:  Social History   Substance Use Topics    Smoking status: Former Smoker     Packs/day: 0.25     Quit date: 1982    Smokeless tobacco: Never Used    Alcohol use No       Review of Systems:  Review of systems not obtained due to patient factors Patient is unable to respond. All other systems are reviewed and are negative.    Health Maintaince :   Primary Care Physician: Blaine  Immunizations:   TDap unknown up to date.  Influenza is up to date, at admission.  Pneumovax unknown up to date.  Cancer Screening:  Colonoscopy: is not indicated     Objective:   Last 24 Hour Vital Signs:  BP  Min: 113/95  Max: 157/32  Temp  Av.1 °F (38.4 °C)  Min: 99.1 °F (37.3 °C)  Max: 102.9 °F (39.4 °C)  Pulse  Av.1  Min: 69  Max: 109  Resp  Av.6  Min: 0  Max: 33  SpO2  Av.1 %  Min: 93 %  Max: 99 %  Weight  Av.5 kg (140 lb)  Min: 63.5 kg (140 lb)  Max: 63.5 kg (140 lb)  Body mass index is 24.03 kg/m².  No intake/output data recorded.    Physical Examination:  /86 (BP Location: Right arm, Patient Position: Sitting)   Pulse 80   Temp (!) 101.2 °F (38.4 °C) (Oral)   Resp 18   Wt 63.5 kg (140 lb)   SpO2 97%   BMI 24.03 kg/m²   General appearance: no distress and somnulent  Head: Normocephalic, without obvious abnormality, atraumatic  Neck: JVD - 8 cm above sternal notch, no adenopathy, no carotid bruit and supple, symmetrical, trachea midline  Lungs: clear to auscultation bilaterally  Heart: regular rate and rhythm, S1, S2 normal, no murmur, click, rub or gallop  Abdomen: soft, non-tender; bowel  sounds normal; no masses,  no organomegaly  Extremities: extremities normal, atraumatic, no cyanosis or edema  Pulses: 2+ and symmetric   Neuro: Could not assess 2/2 mental status, difficult to arouse      Laboratory:  Most Recent Data:  CBC: Lab Results   Component Value Date    WBC 7.78 09/22/2017    HGB 11.1 (L) 09/22/2017    HCT 33.8 (L) 09/22/2017     09/22/2017    MCV 98 09/22/2017    RDW 14.9 (H) 09/22/2017     WBC Differential: 73.1 % N, 15.3 % L, 11.4 % M, 0.1 % Eo, 0.0 % Baso  BMP: Lab Results   Component Value Date     09/22/2017    K 4.0 09/22/2017     09/22/2017    CO2 20 (L) 09/22/2017    BUN 13 09/22/2017    CREATININE 1.1 09/22/2017     (H) 09/22/2017    CALCIUM 10.0 09/22/2017    MG 2.1 09/22/2017    PHOS 3.2 01/27/2017     LFTs: Lab Results   Component Value Date    PROT 9.5 (H) 09/22/2017    ALBUMIN 3.2 (L) 09/22/2017    BILITOT 0.6 09/22/2017    AST 19 09/22/2017    ALKPHOS 85 09/22/2017    ALT 9 (L) 09/22/2017     Coags:   Lab Results   Component Value Date    INR 1.1 03/18/2016     FLP: Lab Results   Component Value Date    CHOL 111 (L) 06/21/2017    HDL 46 06/21/2017    LDLCALC 50.6 (L) 06/21/2017    TRIG 72 06/21/2017    CHOLHDL 41.4 06/21/2017     DM: Lab Results   Component Value Date    HGBA1C 5.2 12/29/2014    HGBA1C 4.7 08/25/2010    LDLCALC 50.6 (L) 06/21/2017    CREATININE 1.1 09/22/2017     Thyroid: Lab Results   Component Value Date    TSH 1.240 07/18/2017     Anemia: Lab Results   Component Value Date    IRON <10 (L) 12/29/2014    TIBC 337 12/29/2014    FERRITIN 1,149 (H) 12/29/2014    [id number] 339 12/29/2014    FOLATE 13.9 12/29/2014     Cardiac: Lab Results   Component Value Date    TROPONINI <0.006 09/22/2017    BNP 78 12/28/2014     Urinalysis: Lab Results   Component Value Date    LABURIN ESCHERICHIA COLI  10,000 - 49,999 cfu/ml   09/08/2017    COLORU Yellow 09/22/2017    SPECGRAV 1.010 09/22/2017    NITRITE Positive (A) 09/22/2017    PROTEINUR trace  08/05/2014    KETONESU Negative 09/22/2017    UROBILINOGEN 2.0-3.0 (A) 09/22/2017    BILIRUBINUR normal 08/05/2014    WBCUA >100 (H) 09/22/2017       Trended Lab Data:    Recent Labs  Lab 09/22/17  0926 09/22/17  1813   WBC 8.38 7.78   HGB 10.5* 11.1*   HCT 32.0* 33.8*    227   MCV 97 98   RDW 15.0* 14.9*    137   K 4.1 4.0    102   CO2 26 20*   BUN 11 13   CREATININE 1.0 1.1   GLU 86 131*   PROT 9.5* 9.5*   ALBUMIN 3.2* 3.2*   BILITOT 0.6 0.6   AST 20 19   ALKPHOS 86 85   ALT 10 9*       Trended Cardiac Data:    Recent Labs  Lab 09/22/17 1813   TROPONINI <0.006       Microbiology Data:  Urine, blood cultures pending    Other Laboratory Data:  Lactate 1.8    Other Results:  EKG (my interpretation): Borderline LVH.    Radiology:  Imaging Results          X-Ray Chest 1 View (Final result)  Result time 09/22/17 18:13:19    Final result by Beth Jacobo Jr., MD (09/22/17 18:13:19)                 Narrative:    Chest single view compared to 09/08/2017.  Patient is rotated to the right.  Heart is mildly enlarged.  Mild diffuse increase in pulmonary vascular markings.  No confluent consolidation on this single projection.  Right arm obscures the right lung base.  Expansile lytic lesion left fourth rib appears new compared to a chest x-ray April 2016 and not definite seen on the study July 2017.      Impression no confluent consolidation.  There is an expansile lytic lesion in the left fourth rib posteriorly as discussed above.  Further evaluation as clinically indicated..      Electronically signed by: BETH JACOBO MD  Date:     09/22/17  Time:    18:13                                Assessment:     Mika Valenzuela is a 81 y.o. male with:  Patient Active Problem List    Diagnosis Date Noted    Sepsis 09/22/2017    SIRS (systemic inflammatory response syndrome) 09/22/2017    Acute cystitis without hematuria 09/22/2017    Acute encephalopathy 09/22/2017    UTI (urinary tract infection) 09/22/2017     Dementia associated with other underlying disease without behavioral disturbance 06/30/2017    Vascular dementia with behavior disturbance 05/31/2017    Encounter for antineoplastic chemotherapy 03/11/2016    Peripheral neuropathy 06/01/2015    Kidney stones 01/14/2015    Compression fracture of L5 lumbar vertebra 01/14/2015    Anemia of chronic disease 12/30/2014    Glaucoma 12/30/2014    HLD (hyperlipidemia) 12/30/2014    BPH (benign prostatic hyperplasia) 12/29/2014    Lumbosacral radiculopathy 12/03/2014    Lumbar radiculopathy 11/03/2014    Sacroiliitis 11/03/2014    Lumbar facet arthropathy 11/03/2014    Chronic low back pain 09/17/2014    IgA myeloma 02/28/2014    Chronic hepatitis C 07/19/2012    HTN (hypertension) 07/19/2012        Plan:     Sepsis, likely urinary source  - Altered mental status at baseline, now worse  - Fever and tachycardia, meets SIRS criteria with source  - UA shows + nitrates and 100+ WBC, many bacteria  - Lactate <2  - Blood cultures and urine cultures pending  - Started Vancomycin and Cefepime in ED  - Telemetry monitoring    Multiple Myeloma  - Follows with Dr. Ellison in Heme/Onc  - Patient has stable protein gap  - lytic lesions on chest XR  - On Acyclovir 2/2 immune compromise, will continue  - Holding Velcade, Decadron  - Immunoglobulins were drawn day of admission for future clinic appointment, primarily IgA Gammopathy  - Needs to follow-up with Heme/Onc following discharge    Vascular Dementia  - Altered at baseline, now worse  - Continuing to monitor  - Continue home Namenda, Venlafaxine    HTN  - Holding home Norvasc and Atenolol 2/2 sepsis for now    Urinary Retention  - Continue home Flomax    Macrocytic Anemia  - H/H on admission 11.1/33.8  - Ferritin elevated and Iron low  - Likely 2/2 chronic disease    Hyperglycemia  - Likely 2/2 chronic steroids  - Will continue to monitor    Chronic Back Pain 2/2 Compression Fracture  - Holding Gabapentin 2/2  mental status    HCM  - PCP Blaine, Heme/Onc Tamma  - Getting flu vax    F 2L bolus in ED  E As needed  N NPO    DVT PPX SCD    Dispo Pending clinical improvement    Code Status:     Full    Lonny Matias  [facility] Internal Medicine HO-1  U IM Service    [facility] Medicine Hospitalist Pager numbers:   [facility] Hospitalist Medicine Team A (Nerissa/Michael): [phone]  [facility] Hospitalist Medicine Team B (Enriqueta/Estiven):  [phone]

## 2017-09-23 NOTE — PT/OT/SLP EVAL
Speech Language Pathology  Evaluation    Mika Valenzuela   MRN: [id number]   Admitting Diagnosis: <principal problem not specified>    Diet recommendations: Solid Diet Level: Puree  Liquid Diet Level: Thin HOB to 90 degrees; feeding assist    SLP Treatment Date: 09/23/17  Speech Start Time: 1045     Speech Stop Time: 1100     Speech Total (min): 15 min       TREATMENT BILLABLE MINUTES:  Eval Swallow and Oral Function 15    Diagnosis: <principal problem not specified>  dysphagia    Past Medical History:   Diagnosis Date    Anemia     Back pain     BPH (benign prostatic hypertrophy)     Chronic constipation     Colon polyp     Diverticulosis     GERD (gastroesophageal reflux disease)     Glaucoma (increased eye pressure)     Hepatitis C     Hypertension     Kidney stone     Myeloma     Urinary tract infection      Past Surgical History:   Procedure Laterality Date    carotid endarterectomy      left    COLON SURGERY         Has the patient been evaluated by SLP for swallowing? : Yes  Keep patient NPO?: No   General Precautions: Standard,            Social Hx: Patient lives with sister at home    Prior diet: unknown at time of this eval    Occupational/hobbies/homemaking: none stated.    Subjective:  Pt is 81 year old male with PMH of vascular dementia, GERD, glaucoma, Hep C who presented to [facility] on 9/22/17 with primary complaint of AMS. Per medical chart, pt reported productive cough with yellow sputum 1-2 days prior to admit. Pt's current RN Linda denied witnessing pt with cough/sputum today. RN stated pt took pills this morning with thin liquids without difficulty. Xray-chest on 9/22/17 impressions: Lytic lesion left 4th rib posteriorly. No confluent consolidation.   Patient goals: none stated.    Pain/Comfort  Pain Rating 1: 0/10    Objective:        Oral Musculature Evaluation  Dentition: scattered dentition  Mucosal Quality: adequate  Mandibular Strength and Mobility: WFL  Oral Labial Strength and  "Mobility: WFL  Lingual Strength and Mobility: WFL  Buccal Strength and Mobility: WFL  Volitional Cough: pt did not follow cough command  Volitional Swallow: pt did not follow swallow command  Voice Prior to PO Intake: low vocal quality     Bedside Swallow Eval:  Consistencies Assessed: Thin liquids approx 2 oz via straw sips and Puree 4 oz via sefl regulated tsp bites  Oral Phase: slow oral transit time  Pharyngeal Phase: no overt clinical  signs/symptoms of aspiration    Additional Treatment:      FIM:  Social Interaction: 2 Maximal Direction--The patient interacts appropriately 25 to 49% of the time, but may beed restraint due to socially inappropriate behaviors.         Comprehension: 3 Moderate Prompting--The patient understands directions and conversation about basid daily needs 50 to 74% of the time.   Expression: 2 Maximal Prompting--The patient expresses basic daily needs and ideas 25 to 49% of the time.  The patient uses only single words or gestures, and (s)he needs prompting more than half the time.          Assessment:  Mika Valenzuela is a 81 y.o. male with a medical diagnosis of <principal problem not specified> and presents with mild dysphagia c/b slow oral transit of solids.Pt requred 1:1 assist for PO trials of puree/thin. Pt with several "false starts" of puree PO consumption--attempted to drink it, attempted to put spoon handle in mouth as straw. Pt required cues to not talk with food in mouth. Slow oral transit of puree observed. Recommendations: 1) puree; 2) thin liquids; 3) HOB raised during and 30 minutes following PO intake; 4) feeding assist.        Do you have any cultural, spiritual, Jehovah's witness conflicts, given your current situation?: no     Discharge recommendations:       Goals:     GOALS    1) Pt will utilize compensatory strategies for safe, efficient swallow of puree/thin at 80% with mod cues.  2) Pt will safely consume 8 of 10 bites of PO trials dental soft without overt s/s of " aspiration.      SLP Goals     Not on file                 Plan:   Patient to be seen Therapy Frequency: 3 x/week   Plan of Care expires: 10/23/17  Plan of Care reviewed with: patient  SLP Follow-up?: Yes  SLP - Next Visit Date: 09/25/17           Hilary Friend CCC-SLP  09/23/2017

## 2017-09-23 NOTE — NURSING
Patient voided 380 cc dark julieth urine. Post void residual bladder scan showed 218 cc. Will continue to monitor.

## 2017-09-24 LAB
ALBUMIN SERPL BCP-MCNC: 2.9 G/DL
ALP SERPL-CCNC: 75 U/L
ALT SERPL W/O P-5'-P-CCNC: 15 U/L
ANION GAP SERPL CALC-SCNC: 13 MMOL/L
AST SERPL-CCNC: 48 U/L
BASOPHILS # BLD AUTO: 0.01 K/UL
BASOPHILS NFR BLD: 0.1 %
BILIRUB SERPL-MCNC: 0.4 MG/DL
BUN SERPL-MCNC: 13 MG/DL
CALCIUM SERPL-MCNC: 9.6 MG/DL
CHLORIDE SERPL-SCNC: 102 MMOL/L
CO2 SERPL-SCNC: 23 MMOL/L
CREAT SERPL-MCNC: 0.9 MG/DL
DIFFERENTIAL METHOD: ABNORMAL
EOSINOPHIL # BLD AUTO: 0 K/UL
EOSINOPHIL NFR BLD: 0.2 %
ERYTHROCYTE [DISTWIDTH] IN BLOOD BY AUTOMATED COUNT: 14.6 %
EST. GFR  (AFRICAN AMERICAN): >60 ML/MIN/1.73 M^2
EST. GFR  (NON AFRICAN AMERICAN): >60 ML/MIN/1.73 M^2
GLUCOSE SERPL-MCNC: 83 MG/DL
HCT VFR BLD AUTO: 29.6 %
HGB BLD-MCNC: 10.1 G/DL
LYMPHOCYTES # BLD AUTO: 2 K/UL
LYMPHOCYTES NFR BLD: 23.4 %
MCH RBC QN AUTO: 32.6 PG
MCHC RBC AUTO-ENTMCNC: 34.1 G/DL
MCV RBC AUTO: 96 FL
MONOCYTES # BLD AUTO: 1.2 K/UL
MONOCYTES NFR BLD: 13.4 %
NEUTROPHILS # BLD AUTO: 5.5 K/UL
NEUTROPHILS NFR BLD: 62.8 %
PLATELET # BLD AUTO: 231 K/UL
PMV BLD AUTO: 9 FL
POTASSIUM SERPL-SCNC: 4.1 MMOL/L
PROT SERPL-MCNC: 8.9 G/DL
RBC # BLD AUTO: 3.1 M/UL
SODIUM SERPL-SCNC: 138 MMOL/L
WBC # BLD AUTO: 8.73 K/UL

## 2017-09-24 PROCEDURE — 11000001 HC ACUTE MED/SURG PRIVATE ROOM

## 2017-09-24 PROCEDURE — 63600175 PHARM REV CODE 636 W HCPCS: Performed by: STUDENT IN AN ORGANIZED HEALTH CARE EDUCATION/TRAINING PROGRAM

## 2017-09-24 PROCEDURE — 85025 COMPLETE CBC W/AUTO DIFF WBC: CPT

## 2017-09-24 PROCEDURE — 36415 COLL VENOUS BLD VENIPUNCTURE: CPT

## 2017-09-24 PROCEDURE — 80053 COMPREHEN METABOLIC PANEL: CPT

## 2017-09-24 PROCEDURE — 25000003 PHARM REV CODE 250: Performed by: NURSE PRACTITIONER

## 2017-09-24 PROCEDURE — 94761 N-INVAS EAR/PLS OXIMETRY MLT: CPT

## 2017-09-24 RX ORDER — HALOPERIDOL 5 MG/ML
10 INJECTION INTRAMUSCULAR ONCE
Status: COMPLETED | OUTPATIENT
Start: 2017-09-24 | End: 2017-09-24

## 2017-09-24 RX ADMIN — DORZOLAMIDE HYDROCHLORIDE 1 DROP: 20 SOLUTION/ DROPS OPHTHALMIC at 08:09

## 2017-09-24 RX ADMIN — HALOPERIDOL LACTATE 10 MG: 5 INJECTION, SOLUTION INTRAMUSCULAR at 09:09

## 2017-09-24 RX ADMIN — TIMOLOL MALEATE 1 DROP: 5 SOLUTION OPHTHALMIC at 08:09

## 2017-09-24 RX ADMIN — LATANOPROST 1 DROP: 50 SOLUTION OPHTHALMIC at 08:09

## 2017-09-24 RX ADMIN — MEMANTINE HYDROCHLORIDE 5 MG: 5 TABLET ORAL at 08:09

## 2017-09-24 RX ADMIN — VENLAFAXINE HYDROCHLORIDE 37.5 MG: 37.5 CAPSULE, EXTENDED RELEASE ORAL at 08:09

## 2017-09-24 RX ADMIN — TAMSULOSIN HYDROCHLORIDE 0.4 MG: 0.4 CAPSULE ORAL at 08:09

## 2017-09-24 NOTE — PROGRESS NOTES
[facility] IM Resident HO-1 Progress Note    Subjective:      Mika Valenzuela is a 81 y.o. AA male who is being followed by the [facility] IM service at [facility] for sepsis.     Patient pulled out his PIV overnight. He was agitated and required IM haldol. Asleep this AM. PIV replaced.     Objective:   Last 24 Hour Vital Signs:  BP  Min: 111/56  Max: 145/65  Temp  Av.3 °F (36.3 °C)  Min: 96 °F (35.6 °C)  Max: 98.5 °F (36.9 °C)  Pulse  Av.1  Min: 53  Max: 80  Resp  Av.7  Min: 16  Max: 18  SpO2  Av.4 %  Min: 96 %  Max: 100 %  I/O last 3 completed shifts:  In: 1050 [IV Piggyback:1050]  Out: 1543 [Urine:1543]    Physical Examination:  General appearance: no distress and somnulent  Head: Normocephalic, without obvious abnormality, atraumatic  Neck: JVD - 8 cm above sternal notch, no adenopathy, no carotid bruit and supple, symmetrical, trachea midline  Lungs: clear to auscultation bilaterally  Heart: regular rate and rhythm, S1, S2 normal, no murmur, click, rub or gallop  Abdomen: soft, non-tender; bowel sounds normal; no masses,  no organomegaly  Extremities: extremities normal, atraumatic, no cyanosis or edema  Pulses: 2+ and symmetric   Neuro: Could not assess 2/2 mental status    Laboratory:  Laboratory Data Reviewed: yes  Pertinent Findings:  Most Recent Data:  CBC: Lab Results   Component Value Date    WBC 8.73 2017    HGB 10.1 (L) 2017    HCT 29.6 (L) 2017     2017    MCV 96 2017    RDW 14.6 (H) 2017     BMP: Lab Results   Component Value Date     2017    K 4.1 2017     2017    CO2 23 2017    BUN 13 2017    CREATININE 0.9 2017    GLU 83 2017    CALCIUM 9.6 2017    MG 2.1 2017    PHOS 3.2 2017     LFTs: Lab Results   Component Value Date    PROT 8.9 (H) 2017    ALBUMIN 2.9 (L) 2017    BILITOT 0.4 2017    AST 48 (H) 2017    ALKPHOS 75 2017    ALT  15 09/24/2017     Coags:   Lab Results   Component Value Date    INR 1.1 03/18/2016     FLP: Lab Results   Component Value Date    CHOL 111 (L) 06/21/2017    HDL 46 06/21/2017    LDLCALC 50.6 (L) 06/21/2017    TRIG 72 06/21/2017    CHOLHDL 41.4 06/21/2017     DM: Lab Results   Component Value Date    HGBA1C 5.2 12/29/2014    HGBA1C 4.7 08/25/2010    LDLCALC 50.6 (L) 06/21/2017    CREATININE 0.9 09/24/2017     Thyroid: Lab Results   Component Value Date    TSH 1.240 07/18/2017     Anemia: Lab Results   Component Value Date    IRON <10 (L) 12/29/2014    TIBC 337 12/29/2014    FERRITIN 1,149 (H) 12/29/2014    [id number] 339 12/29/2014    FOLATE 13.9 12/29/2014     Cardiac: Lab Results   Component Value Date    TROPONINI <0.006 09/22/2017    BNP 78 12/28/2014     Urinalysis: Lab Results   Component Value Date    LABURIN  09/22/2017     PRESUMPTIVE E COLI  >100,000 cfu/ml  Identification and susceptibility pending      COLORU Yellow 09/22/2017    SPECGRAV 1.010 09/22/2017    NITRITE Positive (A) 09/22/2017    PROTEINUR trace 08/05/2014    KETONESU Negative 09/22/2017    UROBILINOGEN 2.0-3.0 (A) 09/22/2017    BILIRUBINUR normal 08/05/2014    WBCUA >100 (H) 09/22/2017       Trended Lab Data:    Recent Labs  Lab 09/22/17  1813 09/23/17  0426 09/24/17  0623 09/24/17  0624   WBC 7.78 12.87*  --  8.73   HGB 11.1* 9.9*  --  10.1*   HCT 33.8* 29.8*  --  29.6*    220  --  231   MCV 98 97  --  96   RDW 14.9* 14.9*  --  14.6*    138 138  --    K 4.0 4.1 4.1  --     104 102  --    CO2 20* 24 23  --    BUN 13 12 13  --    CREATININE 1.1 1.0 0.9  --    * 100 83  --    PROT 9.5* 8.3 8.9*  --    ALBUMIN 3.2* 2.7* 2.9*  --    BILITOT 0.6 0.7 0.4  --    AST 19 24 48*  --    ALKPHOS 85 71 75  --    ALT 9* 9* 15  --        Trended Cardiac Data:    Recent Labs  Lab 09/22/17  1813   TROPONINI <0.006         Microbiology Data Reviewed: yes  Pertinent Findings:  UCx (9/22): presumptive E. Coli  BCx (9/22):  NGTD    Other Results:    Radiology Data Reviewed: yes  Pertinent Findings:  No new imaging studies.    Current Medications:     Infusions:        Scheduled:   cefTRIAXone (ROCEPHIN) IVPB  2 g Intravenous Q24H    dorzolamide  1 drop Both Eyes BID    And    timolol maleate 0.5%  1 drop Both Eyes BID    latanoprost  1 drop Both Eyes QHS    memantine  5 mg Oral BID    sodium chloride 0.9%  3 mL Intravenous Q8H    tamsulosin  0.4 mg Oral Daily    venlafaxine  37.5 mg Oral Daily        PRN:  ibuprofen, influenza    Antibiotics and Day Number of Therapy:  Rocephin: day 2  Vancomycin: 1 dose   Cefipime: 1 dose    Lines and Day Number of Therapy:  PIV    Assessment:     Mika Valenzuela is a 81 y.o.male with  Patient Active Problem List    Diagnosis Date Noted    Sepsis 09/22/2017    SIRS (systemic inflammatory response syndrome) 09/22/2017    Acute cystitis without hematuria 09/22/2017    Acute encephalopathy 09/22/2017    UTI (urinary tract infection) 09/22/2017    Vascular dementia without behavioral disturbance 06/30/2017    Vascular dementia with behavior disturbance 05/31/2017    Encounter for antineoplastic chemotherapy 03/11/2016    Peripheral neuropathy 06/01/2015    Kidney stones 01/14/2015    Compression fracture of L5 lumbar vertebra 01/14/2015    Anemia of chronic disease 12/30/2014    Glaucoma 12/30/2014    HLD (hyperlipidemia) 12/30/2014    BPH (benign prostatic hyperplasia) 12/29/2014    Lumbosacral radiculopathy 12/03/2014    Lumbar radiculopathy 11/03/2014    Sacroiliitis 11/03/2014    Lumbar facet arthropathy 11/03/2014    Chronic low back pain 09/17/2014    IgA myeloma 02/28/2014    Chronic hepatitis C 07/19/2012    HTN (hypertension) 07/19/2012        Plan:     Sepsis, likely urinary source  - Altered mental status at baseline, now worse  - Fever and tachycardia, meets SIRS criteria with source  - UA shows + nitrates and 100+ WBC, many bacteria  - Lactate <2  - Blood  cultures: NGTD. Urine cultures: presumptive E. Coli  - Started Vancomycin and Cefepime in ED  - on rocephin currently  - f/u susceptibilities      Multiple Myeloma  - Follows with Dr. Ellison in Heme/Onc  - Patient has stable protein gap  - lytic lesions on chest XR  - On Acyclovir 2/2 immune compromise, holding currently  - Holding Velcade, Decadron  - Immunoglobulins were drawn day of admission for future clinic appointment, primarily IgA Gammopathy  - Needs to follow-up with Heme/Onc following discharge     Vascular Dementia  - Altered at baseline, now worse  - Continuing to monitor  - Continue home Namenda, Venlafaxine     HTN  - Holding home Norvasc and Atenolol 2/2 sepsis for now     Urinary Retention  - Continue home Flomax     Macrocytic Anemia  - H/H on admission 11.1/33.8  - Ferritin elevated and Iron low  - Likely 2/2 chronic disease  -H/H 10.1/29.6     Hyperglycemia  - Likely 2/2 chronic steroids  - Will continue to monitor  -glucose 83 this morning     Chronic Back Pain 2/2 Compression Fracture  - Holding Gabapentin 2/2 mental status     HCM  - PCP [facility] Heme/Onc [facility]  - Getting flu vax     Diet pureed  DVT PPX SCD, NIKI hose     Dispo Pending clinical improvement    Salty Abdullahi  [facility] Internal Medicine HO-1  U Internal Medicine Service Team B    [facility] Medicine Hospitalist Pager numbers:   [facility] Hospitalist Medicine Team A (Nerissa/Michael): [phone]  [facility] Hospitalist Medicine Team B (Enriqueta/Estiven):  [phone]

## 2017-09-24 NOTE — PLAN OF CARE
Problem: Patient Care Overview  Goal: Plan of Care Review  Outcome: Ongoing (interventions implemented as appropriate)  No acute distress. NSR on tele monitor. HR=60s-80s. No true red alarms noted. Patient is confused thinking he is at home. Redirection and reorientation utilized. Patient is free from falls. Room near nursing station, bed alarm set, bed in low and locked position, non skid socks, SCD, and NIKI applied. Call light within reach. Continue to monitor

## 2017-09-24 NOTE — PLAN OF CARE
Patient was agitated and combative this morning during the lab work. Patient did not let the phlebotomist to draw the blood.  Nurse and PCT helped to hold patient down for the tech to collect the sample. Nurse reported to Dr. Case about the IV access. MD said that he would talk to the attending team when they rounding. Report given to Donya MORALES to continue the patient care.

## 2017-09-24 NOTE — PLAN OF CARE
Patient was restless and agitated trying to get out of bed. He thought that he was at his home. Nurse reoriented patient, but patient still attempting to get out of bed. Patient was unsteady on his feet. Dr. Matias notified. Haloperidol 10 mg/2 ml IM ordered per MD and administered per nurse. Patient is now resting in bed comfortably. Continue to monitor

## 2017-09-24 NOTE — PLAN OF CARE
Nurse got a phone call from pharmacist concerning about patient's medication. Pharmacist expected patient to have his vancomycin 1g every 24 hours for infection. Nurse relayed the message to Dr. Case. Continue to monitor

## 2017-09-24 NOTE — PLAN OF CARE
Problem: Patient Care Overview  Goal: Plan of Care Review  Outcome: Ongoing (interventions implemented as appropriate)  Plan of care reviewed with patient but patient shows no evidence of understanding. NSR on monitor with no red alarms noted. Patient less agitated this am and IV was placed to right arm. Patient received iv antibiotics per orders. Will continue to monitor.

## 2017-09-24 NOTE — PLAN OF CARE
Patient was disoriented and agitated. Patient pulled out the IV. Dr. Case notified. Md came to assess patient and said it was ok for patient not to have an IV until morning. Continue to monitor

## 2017-09-25 VITALS
OXYGEN SATURATION: 96 % | SYSTOLIC BLOOD PRESSURE: 128 MMHG | HEIGHT: 67 IN | BODY MASS INDEX: 19.96 KG/M2 | RESPIRATION RATE: 20 BRPM | HEART RATE: 61 BPM | DIASTOLIC BLOOD PRESSURE: 62 MMHG | TEMPERATURE: 99 F | WEIGHT: 127.19 LBS

## 2017-09-25 LAB
ALBUMIN SERPL BCP-MCNC: 2.9 G/DL
ALP SERPL-CCNC: 72 U/L
ALT SERPL W/O P-5'-P-CCNC: 17 U/L
ANION GAP SERPL CALC-SCNC: 12 MMOL/L
AST SERPL-CCNC: 58 U/L
BACTERIA UR CULT: NORMAL
BASOPHILS # BLD AUTO: 0.01 K/UL
BASOPHILS NFR BLD: 0.2 %
BILIRUB SERPL-MCNC: 0.3 MG/DL
BUN SERPL-MCNC: 13 MG/DL
CALCIUM SERPL-MCNC: 9.5 MG/DL
CHLORIDE SERPL-SCNC: 103 MMOL/L
CO2 SERPL-SCNC: 25 MMOL/L
CREAT SERPL-MCNC: 0.8 MG/DL
DIFFERENTIAL METHOD: ABNORMAL
EOSINOPHIL # BLD AUTO: 0 K/UL
EOSINOPHIL NFR BLD: 0.5 %
ERYTHROCYTE [DISTWIDTH] IN BLOOD BY AUTOMATED COUNT: 14.4 %
EST. GFR  (AFRICAN AMERICAN): >60 ML/MIN/1.73 M^2
EST. GFR  (NON AFRICAN AMERICAN): >60 ML/MIN/1.73 M^2
GLUCOSE SERPL-MCNC: 84 MG/DL
HCT VFR BLD AUTO: 30.6 %
HGB BLD-MCNC: 10.4 G/DL
LYMPHOCYTES # BLD AUTO: 2.1 K/UL
LYMPHOCYTES NFR BLD: 37 %
MCH RBC QN AUTO: 32.6 PG
MCHC RBC AUTO-ENTMCNC: 34 G/DL
MCV RBC AUTO: 96 FL
MONOCYTES # BLD AUTO: 0.8 K/UL
MONOCYTES NFR BLD: 14.7 %
NEUTROPHILS # BLD AUTO: 2.7 K/UL
NEUTROPHILS NFR BLD: 47.4 %
PLATELET # BLD AUTO: 206 K/UL
PMV BLD AUTO: 8.4 FL
POTASSIUM SERPL-SCNC: 3.5 MMOL/L
PROT SERPL-MCNC: 8.8 G/DL
RBC # BLD AUTO: 3.19 M/UL
SODIUM SERPL-SCNC: 140 MMOL/L
WBC # BLD AUTO: 5.71 K/UL

## 2017-09-25 PROCEDURE — 80053 COMPREHEN METABOLIC PANEL: CPT

## 2017-09-25 PROCEDURE — 92526 ORAL FUNCTION THERAPY: CPT

## 2017-09-25 PROCEDURE — G8997 SWALLOW GOAL STATUS: HCPCS | Mod: CI

## 2017-09-25 PROCEDURE — 25000003 PHARM REV CODE 250: Performed by: HOSPITALIST

## 2017-09-25 PROCEDURE — 90662 IIV NO PRSV INCREASED AG IM: CPT | Performed by: INTERNAL MEDICINE

## 2017-09-25 PROCEDURE — G0008 ADMIN INFLUENZA VIRUS VAC: HCPCS | Performed by: INTERNAL MEDICINE

## 2017-09-25 PROCEDURE — 85025 COMPLETE CBC W/AUTO DIFF WBC: CPT

## 2017-09-25 PROCEDURE — 90471 IMMUNIZATION ADMIN: CPT | Performed by: INTERNAL MEDICINE

## 2017-09-25 PROCEDURE — 25000003 PHARM REV CODE 250: Performed by: NURSE PRACTITIONER

## 2017-09-25 PROCEDURE — 36415 COLL VENOUS BLD VENIPUNCTURE: CPT

## 2017-09-25 PROCEDURE — A4216 STERILE WATER/SALINE, 10 ML: HCPCS | Performed by: NURSE PRACTITIONER

## 2017-09-25 PROCEDURE — 94761 N-INVAS EAR/PLS OXIMETRY MLT: CPT

## 2017-09-25 PROCEDURE — G8998 SWALLOW D/C STATUS: HCPCS | Mod: CI

## 2017-09-25 PROCEDURE — 63600175 PHARM REV CODE 636 W HCPCS: Performed by: NURSE PRACTITIONER

## 2017-09-25 PROCEDURE — 63600175 PHARM REV CODE 636 W HCPCS: Performed by: INTERNAL MEDICINE

## 2017-09-25 PROCEDURE — 3E0234Z INTRODUCTION OF SERUM, TOXOID AND VACCINE INTO MUSCLE, PERCUTANEOUS APPROACH: ICD-10-PCS | Performed by: INTERNAL MEDICINE

## 2017-09-25 RX ORDER — CIPROFLOXACIN 500 MG/1
500 TABLET ORAL 2 TIMES DAILY
Qty: 14 TABLET | Refills: 0 | Status: SHIPPED | OUTPATIENT
Start: 2017-09-25 | End: 2017-10-02

## 2017-09-25 RX ORDER — POTASSIUM CHLORIDE 20 MEQ/1
20 TABLET, EXTENDED RELEASE ORAL ONCE
Status: COMPLETED | OUTPATIENT
Start: 2017-09-25 | End: 2017-09-25

## 2017-09-25 RX ADMIN — VENLAFAXINE HYDROCHLORIDE 37.5 MG: 37.5 CAPSULE, EXTENDED RELEASE ORAL at 08:09

## 2017-09-25 RX ADMIN — CEFTRIAXONE 2 G: 2 INJECTION, SOLUTION INTRAVENOUS at 08:09

## 2017-09-25 RX ADMIN — DORZOLAMIDE HYDROCHLORIDE 1 DROP: 20 SOLUTION/ DROPS OPHTHALMIC at 08:09

## 2017-09-25 RX ADMIN — INFLUENZA A VIRUSA/MICHIGAN/45/2015 X-275 (H1N1) ANTIGEN (FORMALDEHYDE INACTIVATED), INFLUENZA A VIRUS A/HONG KONG/4801/2014 X-263B (H3N2) ANTIGEN (FORMALDEHYDE INACTIVATED), AND INFLUENZA B VIRUS B/BRISBANE/60/2008 ANTIGEN (FORMALDEHYDE INACTIVATED) 0.5 ML: 60; 60; 60 INJECTION, SUSPENSION INTRAMUSCULAR at 01:09

## 2017-09-25 RX ADMIN — SODIUM CHLORIDE, PRESERVATIVE FREE 3 ML: 5 INJECTION INTRAVENOUS at 01:09

## 2017-09-25 RX ADMIN — TAMSULOSIN HYDROCHLORIDE 0.4 MG: 0.4 CAPSULE ORAL at 08:09

## 2017-09-25 RX ADMIN — TIMOLOL MALEATE 1 DROP: 5 SOLUTION OPHTHALMIC at 08:09

## 2017-09-25 RX ADMIN — POTASSIUM CHLORIDE 20 MEQ: 20 TABLET, EXTENDED RELEASE ORAL at 08:09

## 2017-09-25 RX ADMIN — MEMANTINE HYDROCHLORIDE 5 MG: 5 TABLET ORAL at 08:09

## 2017-09-25 NOTE — NURSING
Discharge instruction and education provided. Patient voices understanding. IV site and telemetry discontinued without adverse reaction. Patient discharged with no acute distress noted.

## 2017-09-25 NOTE — PROGRESS NOTES
[facility] IM Resident HO-III Progress Note    Subjective:      Mika Valenzuela is a 81 y.o. AA male who is being followed by the [facility] IM service at [facility] for sepsis.     Patient eating breakfast this AM. Does not answer questions.     Objective:   Last 24 Hour Vital Signs:  BP  Min: 89/51  Max: 139/64  Temp  Av.2 °F (36.8 °C)  Min: 97.7 °F (36.5 °C)  Max: 98.6 °F (37 °C)  Pulse  Av.9  Min: 62  Max: 82  Resp  Av.7  Min: 16  Max: 18  SpO2  Av.7 %  Min: 96 %  Max: 97 %  I/O last 3 completed shifts:  In: -   Out: 400 [Urine:400]    Physical Examination:  General appearance: no distress and somnulent  Head: Normocephalic, without obvious abnormality, atraumatic  Neck: JVD - 8 cm above sternal notch, no adenopathy, no carotid bruit and supple, symmetrical, trachea midline  Lungs: clear to auscultation bilaterally  Heart: regular rate and rhythm, S1, S2 normal, no murmur, click, rub or gallop  Abdomen: soft, non-tender; bowel sounds normal; no masses,  no organomegaly  Extremities: extremities normal, atraumatic, no cyanosis or edema  Pulses: 2+ and symmetric   Neuro: Could not assess 2/2 mental status    Laboratory:  Laboratory Data Reviewed: yes  Pertinent Findings:  Most Recent Data:  CBC: Lab Results   Component Value Date    WBC 8.73 2017    HGB 10.1 (L) 2017    HCT 29.6 (L) 2017     2017    MCV 96 2017    RDW 14.6 (H) 2017     BMP: Lab Results   Component Value Date     2017    K 4.1 2017     2017    CO2 23 2017    BUN 13 2017    CREATININE 0.9 2017    GLU 83 2017    CALCIUM 9.6 2017    MG 2.1 2017    PHOS 3.2 2017     LFTs: Lab Results   Component Value Date    PROT 8.9 (H) 2017    ALBUMIN 2.9 (L) 2017    BILITOT 0.4 2017    AST 48 (H) 2017    ALKPHOS 75 2017    ALT 15 2017     Coags:   Lab Results   Component Value Date    INR 1.1  03/18/2016     FLP: Lab Results   Component Value Date    CHOL 111 (L) 06/21/2017    HDL 46 06/21/2017    LDLCALC 50.6 (L) 06/21/2017    TRIG 72 06/21/2017    CHOLHDL 41.4 06/21/2017     DM: Lab Results   Component Value Date    HGBA1C 5.2 12/29/2014    HGBA1C 4.7 08/25/2010    LDLCALC 50.6 (L) 06/21/2017    CREATININE 0.9 09/24/2017     Thyroid: Lab Results   Component Value Date    TSH 1.240 07/18/2017     Anemia: Lab Results   Component Value Date    IRON <10 (L) 12/29/2014    TIBC 337 12/29/2014    FERRITIN 1,149 (H) 12/29/2014    VSIUBFJW21 339 12/29/2014    FOLATE 13.9 12/29/2014     Cardiac: Lab Results   Component Value Date    TROPONINI <0.006 09/22/2017    BNP 78 12/28/2014     Urinalysis: Lab Results   Component Value Date    LABURIN  09/22/2017     PRESUMPTIVE E COLI  >100,000 cfu/ml  Identification and susceptibility pending      COLORU Yellow 09/22/2017    SPECGRAV 1.010 09/22/2017    NITRITE Positive (A) 09/22/2017    PROTEINUR trace 08/05/2014    KETONESU Negative 09/22/2017    UROBILINOGEN 2.0-3.0 (A) 09/22/2017    BILIRUBINUR normal 08/05/2014    WBCUA >100 (H) 09/22/2017       Trended Lab Data:    Recent Labs  Lab 09/22/17  1813 09/23/17  0426 09/24/17  0623 09/24/17  0624   WBC 7.78 12.87*  --  8.73   HGB 11.1* 9.9*  --  10.1*   HCT 33.8* 29.8*  --  29.6*    220  --  231   MCV 98 97  --  96   RDW 14.9* 14.9*  --  14.6*    138 138  --    K 4.0 4.1 4.1  --     104 102  --    CO2 20* 24 23  --    BUN 13 12 13  --    CREATININE 1.1 1.0 0.9  --    * 100 83  --    PROT 9.5* 8.3 8.9*  --    ALBUMIN 3.2* 2.7* 2.9*  --    BILITOT 0.6 0.7 0.4  --    AST 19 24 48*  --    ALKPHOS 85 71 75  --    ALT 9* 9* 15  --        Trended Cardiac Data:    Recent Labs  Lab 09/22/17  1813   TROPONINI <0.006         Microbiology Data Reviewed: yes  Pertinent Findings:  UCx (9/22): presumptive E. Coli  BCx (9/22): NGTD    Other Results:    Radiology Data Reviewed: yes  Pertinent Findings:  No new  imaging studies.    Current Medications:     Infusions:        Scheduled:   cefTRIAXone (ROCEPHIN) IVPB  2 g Intravenous Q24H    dorzolamide  1 drop Both Eyes BID    And    timolol maleate 0.5%  1 drop Both Eyes BID    latanoprost  1 drop Both Eyes QHS    memantine  5 mg Oral BID    potassium chloride  20 mEq Oral Once    sodium chloride 0.9%  3 mL Intravenous Q8H    tamsulosin  0.4 mg Oral Daily    venlafaxine  37.5 mg Oral Daily        PRN:  ibuprofen, influenza    Antibiotics and Day Number of Therapy:  Rocephin: day 2  Vancomycin: 1 dose   Cefipime: 1 dose    Lines and Day Number of Therapy:  PIV    Assessment:     Mika Valenzuela is a 81 y.o.male with  Patient Active Problem List    Diagnosis Date Noted    Sepsis 09/22/2017    SIRS (systemic inflammatory response syndrome) 09/22/2017    Acute cystitis without hematuria 09/22/2017    Acute encephalopathy 09/22/2017    UTI (urinary tract infection) 09/22/2017    Vascular dementia without behavioral disturbance 06/30/2017    Vascular dementia with behavior disturbance 05/31/2017    Encounter for antineoplastic chemotherapy 03/11/2016    Peripheral neuropathy 06/01/2015    Kidney stones 01/14/2015    Compression fracture of L5 lumbar vertebra 01/14/2015    Anemia of chronic disease 12/30/2014    Glaucoma 12/30/2014    HLD (hyperlipidemia) 12/30/2014    BPH (benign prostatic hyperplasia) 12/29/2014    Lumbosacral radiculopathy 12/03/2014    Lumbar radiculopathy 11/03/2014    Sacroiliitis 11/03/2014    Lumbar facet arthropathy 11/03/2014    Chronic low back pain 09/17/2014    IgA myeloma 02/28/2014    Chronic hepatitis C 07/19/2012    HTN (hypertension) 07/19/2012        Plan:     Sepsis, likely urinary source  - Altered mental status at baseline, now worse  - Fever and tachycardia, meets SIRS criteria with source  - UA shows + nitrates and 100+ WBC, many bacteria  - Lactate <2  - Blood cultures: NGTD. Urine cultures: presumptive E.  Coli  - Started Vancomycin and Cefepime in ED  - on rocephin currently  - f/u susceptibilities      Multiple Myeloma  - Follows with Dr. Ellison in Heme/Onc  - Patient has stable protein gap  - lytic lesions on chest XR  - On Acyclovir 2/2 immune compromise, holding currently  - Holding Velcade, Decadron  - Immunoglobulins were drawn day of admission for future clinic appointment, primarily IgA Gammopathy  - Needs to follow-up with Heme/Onc following discharge     Vascular Dementia  - Altered at baseline, now worse  - Continuing to monitor  - Continue home Namenda, Venlafaxine     HTN  - Holding home Norvasc and Atenolol 2/2 sepsis for now     Urinary Retention  - Continue home Flomax     Macrocytic Anemia  - H/H on admission 11.1/33.8  - Ferritin elevated and Iron low  - Likely 2/2 chronic disease  -H/H 10.1/29.6     Hyperglycemia  - Likely 2/2 chronic steroids  - Will continue to monitor  -glucose 83 this morning     Chronic Back Pain 2/2 Compression Fracture  - Holding Gabapentin 2/2 mental status     HCM  - PCP Bl[place], Heme/Onc [place]  - Getting flu vax     Diet pureed  DVT PPX SCD, NIKI hose     Dispo Pending clinical improvement    Lee Ann Baker  [facility] Internal Medicine HO-III  U Internal Medicine Service Team B    [facility] Medicine Hospitalist Pager numbers:   U Hospitalist Medicine Team A (Nerissa/Michael): [phone]  [facility] Hospitalist Medicine Team B (Enriqueta/Estiven):  [phone]

## 2017-09-25 NOTE — DISCHARGE INSTRUCTIONS
Confusion (English) View Edit Remove   Dementia Patients, Safety Tips for: For Caregivers (English) View Edit Remove   Urinary Tract Infections (UTIs), Understanding (English) View Edit Remove   Heart Failure, What is (English) View Edit Remove   Heart Failure, Discharge Instructions for (English) View Edit Remove   Heart Failure: Tracking Your Weight (English) View Edit Remove   Ciprofloxacin tablets (English) View Edit Remove   (Adult), Influenza (English) View Edit Remove

## 2017-09-25 NOTE — PLAN OF CARE
Problem: SLP Goal  Goal: SLP Goal  Outcome: Outcome(s) achieved Date Met: 09/25/17  Pt tolerating pureed tray and thin liquids with no oral or pharyngeal issues. Sister reports pt eats mashed foods at home and able to feed self.

## 2017-09-25 NOTE — PROGRESS NOTES
.Pharmacy New Medication Education    Patient accepted medication education.    Pharmacy educated patient on name and purpose of medications and possible side effects, using the teach-back method.     Rocephin  Cosopt  Ibuprofen  Xalatan  Namenda  Flomax  venlafaxine    Learners of pharmacy medication education included:  patient    Patient +/- learner response:  teachback

## 2017-09-25 NOTE — PLAN OF CARE
TN met with patient, brother at bedside.  Patient discharged to home with family.  When patient's sister returns, will inquire on HH preferences.  TN also talked with brother regarding priority care clinic. TN message office to schedule per their preference.    TN met with patient and sisters at bedside. They were also agreeable to priority care clinic follow-up and home health. TN gave them preferred provider handout. They did not have a preference. TN sent HH orders to [facility], sister in agreement.--Patient has been accepted by [facility]. TN notified patient and family, patient choice form signed.    Future Appointments  Date Time Provider Department Center   10/2/2017 11:00 AM Dania Beltrán MD [facility] IM ROSENDA [place] [facility]   10/5/2017 11:00 AM Curly Ellison MD [facility] HEM ONC [facility]   1/18/2018 7:45 AM LAB, [facility] [place]   1/22/2018 8:40 AM Lily Valladares MD [facility] IM [place]   2/9/2018 10:20 AM Conrad Grewal MD [facility] NEURO [place]     Dania Beltrán MD  On 10/2/2017  at 11:00 am---Labs prior at 10:30 am in the medical office building.  [address]  [address]  [place] [id number]  [phone]   [facility] [place]    [facility]  [address]  [address]  [place] [id number]  [phone]   Labs  On 10/2/2017  at 10:30 am  [address]  1st Floor of Medical Office Building        09/25/17 1246   Final Note   Assessment Type Final Discharge Note   Discharge Disposition Home-Health   What phone number can be called within the next 1-3 days to see how you are doing after discharge? [phone]   Hospital Follow Up  Appt(s) scheduled? Yes   Discharge plans and expectations educations in teach back method with documentation complete? Yes   Right Care Referral Info   Post Acute Recommendation Home-care   Referral Type Home Health     Theodora Martinez RN  Transition Navigator  [phone]

## 2017-09-25 NOTE — PLAN OF CARE
Problem: Urinary Tract Infection (Adult)  Goal: Signs and Symptoms of Listed Potential Problems Will be Absent, Minimized or Managed (Urinary Tract Infection)  Signs and symptoms of listed potential problems will be absent, minimized or managed by discharge/transition of care (reference Urinary Tract Infection (Adult) CPG).   Outcome: Unable to achieve outcome(s) by discharge  Will continue to take antibiotics outpatient

## 2017-09-25 NOTE — PLAN OF CARE
Problem: Patient Care Overview  Goal: Plan of Care Review  Pt received on RA , spo2 97%. Will continue to monitor.

## 2017-09-25 NOTE — PT/OT/SLP PROGRESS
Speech Language Pathology  Swallow Treatment/Diet Follow-Up    Mika Valenzuela   MRN: [id number]   Admitting Diagnosis: Sepsis    Diet recommendations: Solid Diet Level: Puree  Liquid Diet Level: Thin   Upright for meals, set up tray, small bites/sips, straws ok, eat slowly, whole meds one by one     SLP Treatment Date: 09/25/17  Speech Start Time: 1150     Speech Stop Time: 1201     Speech Total (min): 11 min       TREATMENT BILLABLE MINUTES:  Treatment Swallowing Dysfunction 11    Has the patient been evaluated by SLP for swallowing? : Yes  Keep patient NPO?: No   General Precautions: Standard, fall (standard)          Subjective:  Pt seen at bedside for diet f/u. Sister and family in room.     Pain/Comfort  Pain Rating 1: 0/10    Objective:   Patient found with: telemetry  Pt seen for direct dysphagia tx and diet f/u.  Pt required assist to be repositioned in bed.   SLP called PCT to assist with reposition in bed. Pt was pulled up to 80 degrees in bed. Pt reported no issues with breakfast. PCT reported pt fed self and consumed all of pureed tray.   Pt did agree to additional po trials: water by straw, applesauce and 1/4 cracker.   Oral phase: timely swallow, very trace residue of cracker noted, slow mastication due to missing back molars.   Pharyngeal phase: timely swallow noted, fair laryngeal lift noted, no coughing/choking or change in voice present.   Pt safe for continued pureed texture while at the hospital  but may resume soft solids once he returns home. Sister prepares his meals at home.     FIM:  Social Interaction: 3 Moderate Direction--The patient interacts appropriately 50 to 74% of the time.                            Assessment:  Mika Valenzuela is a 81 y.o. male with a medical diagnosis of Sepsis and presents with baseline swallow mechanism. Pt safe for continued oral diet.     Discharge recommendations: Discharge Facility/Level Of Care Needs: home     Goals:    SLP Goals     Not on file           Multidisciplinary Problems (Resolved)        Problem: SLP Goal    Goal Priority Disciplines Outcome   SLP Goal   (Resolved)     SLP Outcome(s) achieved                    Plan:   Patient to be seen    Plan of Care expires: 10/23/17  Plan of Care reviewed with: patient, caregiver (sister)  SLP Follow-up?: No        SLP G-Codes  Functional Assessment Tool Used: NOMS  Score: 5  Functional Limitations: Swallowing  Swallow Goal Status (): CI  Swallow Discharge Status (): CI    RHINA Rosado, CCC-SLP  09/25/2017

## 2017-09-25 NOTE — PLAN OF CARE
Problem: Patient Care Overview  Goal: Plan of Care Review  Outcome: Ongoing (interventions implemented as appropriate)  Pt is oriented to self, VSS, no distress identified, afebrile. Pt is close to nurses station for fall prevention measures. Frequent reorientation is provided with education. No family at the bedside tonight, nursing care is ongoing. Will continue to monitor.    Problem: Pressure Ulcer Risk (Sy Scale) (Adult,Obstetrics,Pediatric)  Goal: Identify Related Risk Factors and Signs and Symptoms  Related risk factors and signs and symptoms are identified upon initiation of Human Response Clinical Practice Guideline (CPG)   Pt frequently moves self, no SS of skin breakdown present.      Problem: Infection, Risk/Actual (Adult)  Goal: Identify Related Risk Factors and Signs and Symptoms  Related risk factors and signs and symptoms are identified upon initiation of Human Response Clinical Practice Guideline (CPG)   Outcome: Ongoing (interventions implemented as appropriate)  Pt confused at baseline, pt required haldol tonight for frequent climbing out of bed attempts and not following fall prevention protocol education. Unable to identify what is pts baseline currently, no family at the bedside. Pt is only oriented to self.     Problem: Urinary Tract Infection (Adult)  Goal: Signs and Symptoms of Listed Potential Problems Will be Absent, Minimized or Managed (Urinary Tract Infection)  Signs and symptoms of listed potential problems will be absent, minimized or managed by discharge/transition of care (reference Urinary Tract Infection (Adult) CPG).   Outcome: Ongoing (interventions implemented as appropriate)  Pt is afebrile, denies any pain, urine color WDL, no odor

## 2017-09-26 ENCOUNTER — PATIENT OUTREACH (OUTPATIENT)
Dept: ADMINISTRATIVE | Facility: CLINIC | Age: 82
End: 2017-09-26

## 2017-09-26 DIAGNOSIS — M54.50 CHRONIC MIDLINE LOW BACK PAIN WITHOUT SCIATICA: ICD-10-CM

## 2017-09-26 DIAGNOSIS — S32.050S COMPRESSION FRACTURE OF L5 LUMBAR VERTEBRA, SEQUELA: ICD-10-CM

## 2017-09-26 DIAGNOSIS — G89.29 CHRONIC MIDLINE LOW BACK PAIN WITHOUT SCIATICA: ICD-10-CM

## 2017-09-26 NOTE — TELEPHONE ENCOUNTER
Spoke with Janes from [facility]. Verified Gabapentin directions and Janes reports patient does not have any eye drops either. Instructed will send refill request to Dr. Valladares for approval.

## 2017-09-26 NOTE — PHYSICIAN QUERY
PT Name: Mika Valenzuela  MR #: [id number]    Physician Query Form - Neurological Condition Clarification       CDS/: Mindy Sarah               Contact information:[email or web address]    This form is a permanent document in the medical record.     Query Date: September 26, 2017    By submitting this query, we are merely seeking further clarification of documentation. Please utilize your independent clinical judgment when addressing the question(s) below.    The Medical record contains the following:   Indicators   Supporting Clinical Findings Location in Medical Record   x AMS, Confusion, LOC, etc. AMS H&P   x Acute / Chronic Illness Sepsis,Acute cystitis without hematuria,Acute encephalopathy HM note 9-24    Radiology Findings      Electrolyte Imbalance     x Medication Cefipime,Vancomycin,Rocephin HM note 9-24    Treatment      Other       Provider, please specify the diagnosis or diagnoses associated with above clinical findings.    Please further specify the type of encephalopathy.    [  ] Metabolic Encephalopathy    [  ] Other Encephalopathy    [  ] Other (please specify): _____________________________________    [x  ] Clinically Undetermined      Please document in your progress notes daily for the duration of treatment until resolved, and  include in your discharge summary.

## 2017-09-26 NOTE — TELEPHONE ENCOUNTER
----- Message from Oxana Paul sent at 9/26/2017 12:40 PM CDT -----  Contact: Janes([facility])/[phone]  Janes would like to speak with you about if the patient should be taking gabapentin 100 mg 3 times a day , if so he needs a refill. Please advise.

## 2017-09-26 NOTE — PHYSICIAN QUERY
PT Name: Mika Valenzuela  MR #: [id number]    Physician Query Form - Cause and Effect Relationship Clarification      CDS/: Mindy Sarah               Contact information:[email or web address]    This form is a permanent document in the medical record.     Query Date: September 26, 2017    By submitting this query, we are merely seeking further clarification of documentation. Please utilize your independent clinical judgment when addressing the question(s) below.    The Medical record contains the following:  Supporting Clinical Findings   Location in record   Sepsis, likely urinary source  - Altered mental status at baseline, now worse  - Fever and tachycardia, meets SIRS criteria with source  - UA shows + nitrates and 100+ WBC, many bacteria  - Lactate <2  - Blood cultures and urine cultures pending  - Started Vancomycin and Cefepime in ED  - Telemetry monitoring                                                          Acute cystitis without hematuria                                                                               HM note 9-24                            HM note,Active problem list 9-24                                                                    UCx (9/22): presumptive E. Coli                                                                                                                          HM note 9-24         Provider, please clarify if there is any correlation between Sepsis and E.Coli.           Are the conditions:     [ x ] Due to or associated with each other     [  ] Unrelated to each other     [  ] Other (Please Specify): _________________________     [  ] Clinically Undetermined

## 2017-09-26 NOTE — DISCHARGE SUMMARY
[facility] Discharge Summary    Primary Team: [facility] Team B  Attending Physician: Dr. Robison  Resident: Olivia  Intern: Goldie    Date of Admit: 9/22/2017  Date of Discharge: 9/25/17    Discharge to: home  Condition: stable    Discharge Diagnoses     Patient Active Problem List   Diagnosis    Chronic hepatitis C    HTN (hypertension)    IgA myeloma    Chronic low back pain    Lumbar radiculopathy    Sacroiliitis    Lumbar facet arthropathy    Lumbosacral radiculopathy    BPH (benign prostatic hyperplasia)    Anemia of chronic disease    Glaucoma    HLD (hyperlipidemia)    Kidney stones    Compression fracture of L5 lumbar vertebra    Peripheral neuropathy    Encounter for antineoplastic chemotherapy    Vascular dementia with behavior disturbance    Vascular dementia without behavioral disturbance    Sepsis    SIRS (systemic inflammatory response syndrome)    Acute cystitis without hematuria    Acute encephalopathy    UTI (urinary tract infection)       Consultants and Procedures     Consultants:  none    Procedures:   none    Brief History of Present Illness      Mika Valenzuela is a 81 y.o. AA male who  has a past medical history of Anemia; Back pain; BPH (benign prostatic hypertrophy); Chronic constipation; Colon polyp; Diverticulosis; GERD (gastroesophageal reflux disease); Glaucoma (increased eye pressure); Hepatitis C; Hypertension; Kidney stone; Myeloma; and Urinary tract infection.. The patient presented to [facility] on 9/22/2017 with a primary complaint of Altered Mental Status (altered mental status this evening. pt was found outside in a chair difficult to arouse. pt is awake and alert at this time. states taht eh feels fine. reports productive cough with yellow sputum)        The patient was in their usual state of health until day of admission. He does not have any family with him to corroborate story at this time, and he has advanced  dementia which is exacerbated 2/2 his acute illness. Per secondary sources, the patient is altered at baseline and became more altered. He has multiple myeloma, IgA gammopathy primarily, and follows with Heme/onc. In the ED he was tachycardic and febrile. He is unable to be aroused to give any further history.      Per clinic notes, he lives with his sister and she is the primary caretaker.     For the full HPI please refer to the History & Physical from this admission.    Hospital Course By Problem with Pertinent Findings     Sepsis 2/2 E. Coli UTI  - Altered mental status at baseline, worse on arrival to ED  - Fever and tachycardia, meets SIRS criteria with source  - UA shows + nitrates and 100+ WBC, many bacteria  - Lactate <2  - Started Vancomycin and Cefepime in ED  - Given Rocephin while inpatient  -Urine Cx with E coli sensitive to Cipro, discharged with an additional 7 days of Cipro.     Multiple Myeloma  - Follows with Dr. Ellison in Heme/Onc  - Patient has stable protein gap  - lytic lesions on chest XR  - On Acyclovir 2/2 immune compromise, holding currently  - Holding Velcade, Decadron  - Immunoglobulins were drawn day of admission for future clinic appointment, primarily IgA Gammopathy  - Needs to follow-up with Heme/Onc as outpatient     Vascular Dementia  - Altered at baseline, now worse  - Continuing to monitor  - Continued home Namenda, Venlafaxine  -Daughter is caretaker.     HTN  - Restarted home norvasc and atenolol on discharge     Urinary Retention  - Continued home Flomax     Macrocytic Anemia  - H/H on admission 11.1/33.8  - Ferritin elevated and Iron low  - Likely 2/2 chronic disease  -H/H 10.1/29.6         HCM  - PCP Brian Valladares/Onc Scot  - Given flu vaccine prior to discharge      Discharge Medications        Medication List      START taking these medications    ciprofloxacin HCl 500 MG tablet  Commonly known as:  CIPRO  Take 1 tablet (500 mg total) by mouth 2 (two) times daily.         CONTINUE taking these medications    acyclovir 400 MG tablet  Commonly known as:  ZOVIRAX  Take 1 tablet (400 mg total) by mouth once daily.     amlodipine 10 MG tablet  Commonly known as:  NORVASC  Take 1 tablet (10 mg total) by mouth once daily.     atenolol 50 MG tablet  Commonly known as:  TENORMIN  Take 1 tablet (50 mg total) by mouth once daily.     bortezomib 3.5 mg injection  Commonly known as:  VELCADE     brimonidine 0.15 % OPTH DROP 0.15 % ophthalmic solution  Commonly known as:  ALPHAGAN     dexamethasone 4 MG Tab  Commonly known as:  DECADRON     dorzolamide-timolol 2-0.5% 22.3-6.8 mg/mL ophthalmic solution  Commonly known as:  COSOPT     DUREZOL 0.05 % Drop ophthalmic solution  Generic drug:  difluprednate     gabapentin 100 MG capsule  Commonly known as:  NEURONTIN  TAKE 2 CAPSULES EVERY MORNING, 1 CAP MID DAY, THEN 2 CAPS EVERY EVENING     latanoprost 0.005 % ophthalmic solution     memantine 5 MG Tab  Commonly known as:  NAMENDA  Take 1 tablet (5 mg total) by mouth once daily.     tamsulosin 0.4 mg Cp24  Commonly known as:  FLOMAX  Take 1 capsule (0.4 mg total) by mouth once daily.     venlafaxine 37.5 MG 24 hr capsule  Commonly known as:  EFFEXOR-XR  TAKE 1 CAPSULE (37.5 MG TOTAL) BY MOUTH ONCE DAILY.           Where to Get Your Medications      You can get these medications from any pharmacy    Bring a paper prescription for each of these medications  · ciprofloxacin HCl 500 MG tablet         Discharge Information:   Diet:  Cardiac    Physical Activity:  As tolerated    Instructions:  1. Take all medications as prescribed  2. Keep all follow-up appointments  3. Return to the hospital or call your primary care physicians if any worsening symptoms occur.      Follow-Up Appointments:  PCP  Dr. Scot Baker  [facility], [facility]

## 2017-09-26 NOTE — TELEPHONE ENCOUNTER
----- Message from Oxana Paul sent at 9/26/2017 12:40 PM CDT -----  Contact: [facility])/[phone]  Janes would like to speak with you about if the patient should be taking gabapentin 100 mg 3 times a day , if so he needs a refill. Please advise.

## 2017-09-26 NOTE — PATIENT INSTRUCTIONS
Urinary Tract Infections in Men    Urinary tract infections (UTIs) are most often caused by bacteria that invade the urinary tract. The bacteria may come from outside the body. Or they may travel from the skin outside of rectum into the urethra. Pain in or around the urinary tract is a common symptom for most UTIs. But the only way to know for sure if you have a UTI is to have a urinalysis and urine culture.   Types of UTIs  · Cystitis: This is a bladder infection and is often linked to a blockage from an enlarged prostate. You may have an urgent or frequent need to urinate, and bloody urine. Treatment includes antibiotics and medicine to relax or shrink the prostate. Sometimes, surgery is needed.  · Urethritis: This is an infection of the urethra. You may have a discharge from the urethra or burning when you urinate. You may also have pain in the urethra or penis. It is treated with antibiotics.  · Prostatitis: This is an inflammation or infection of the prostate. You may have an urgent or frequent need to urinate, fever, or burning when you urinate. Or you may have a tender prostate, or a vague feeling of pressure. Prostatitis is treated with a range of medicines, depending on the cause.  · Pyelonephritis: This is a kidney infection. If not treated, it can be serious and damage your kidneys. In severe cases you may be hospitalized. You may have a fever and upper back pain.  Treating a UTI  · Medicine: Most UTIs are treated with antibiotics. These kill the bacteria. The length of time you need to take them depends on the type of infection. Take antibiotics exactly as directed until all of the medicine is gone. If you don't, the infection may not go away and may become harder to treat. For certain types of UTIs, you may be given other medicine to help treat your symptoms.  · Lifestyle changes: The lifestyle changes below will help get rid of your current infection. They may also help prevent future UTIs.  ¨ Drink  plenty of fluids such as water, juice, or other caffeine-free drinks. This helps flush bacteria out of your system.  ¨ Empty your bladder when you feel the urge to urinate and before going to sleep. Urine that stays in your bladder promotes infection.  ¨ Use condoms during sex. These help prevent UTIs caused by sexually transmitted bacteria.  ¨ Keep follow-up appointments with your healthcare provider. He or she can may do tests to make sure the infection has cleared. If needed, more treatment can be started.  · Other treatment: Most UTIs respond to medicine. But sometimes a procedure or surgery is needed. This can treat an enlarged prostate, or remove a kidney stone or other blockage. Surgery may also treat problems caused by scarring or long-term infections.  Date Last Reviewed: 1/1/2017 © 2000-2017 [employer]. [address] [place] [address]. All rights reserved. This information is not intended as a substitute for professional medical care. Always follow your healthcare professional's instructions.        When Your Child Has a Urinary Tract Infection (UTI)   A urinary tract infection (UTI) is a bacterial infection in the urinary tract. The urinary tract is made up of the kidneys, ureters, bladder, and urethra. Children often get UTIs that affect the bladder. UTIs can be uncomfortable and painful. But with treatment, most children recover with no lasting effects.  What is the urinary tract?  The following body parts make up the urinary tract:     A urinary tract infection is caused by bacteria that enter the urinary tract.    · Kidneys filter waste from the blood and make urine.  · Ureters carry urine from the kidneys to the bladder.  · The bladder stores urine.  · The urethra carries urine from the bladder to the outside of the body.  What causes a urinary tract infection?  Most UTIs are caused by bacteria that enter the urinary tract through the urethra. The urinary tracts of boys and  girls are slightly different. The urethra is shorter in girls. This makes it easier for bacteria to enter. As a result, girls are more likely than boys to get UTIs.  What are the symptoms of a urinary tract infection?  · If your child has a UTI affecting the bladder (cystitis), symptoms can include:  ¨ Painful urination  ¨ Frequent urination  ¨ Urgent need to urinate  ¨ Blood in the urine  ¨ Daytime wetting or nighttime bedwetting when previously continent  · If your child has a UTI affecting the kidneys (pyelonephritis), symptoms are similar to those of a bladder infection. They can also include:  ¨ Fever  ¨ Abdominal pain  ¨ Nausea and vomiting  ¨ Cloudy urine  ¨ Foul-smelling urine  How is a urinary tract infection diagnosed?  · The doctor asks about your childs symptoms and health history. Your child is examined.  · A lab test, such as a urinalysis, is done. For this test, a urine sample is needed to check for bacteria and other signs of infection. The urine is also sent for a culture, a test that identifies what bacteria is growing in the urine. It can take 1 to 3 days to get results of a urine culture. If a UTI is suspected, the doctor will likely start treatment even before lab results come back.  · If your child has severe symptoms, other tests may be done. Youll be told more about this, if needed.  How is a urinary tract infection treated?  · Symptoms of a UTI generally go away within 24 to 72 hours of starting treatment.  · The doctor will prescribe antibiotics for your child. Make sure your child takes ALL of the medication even if he or she starts feeling better.   · You can do the following at home to relieve your childs symptoms:  ¨ Give your child over-the-counter (OTC) medications, such as ibuprofen or acetaminophen, to manage pain and fever. Do not give ibuprofen to an infant who is less than 6 months of age, or to a child who is dehydrated or constantly vomiting. Do not give aspirin to a child  with a fever. This can put your child at risk of a serious illness called Reyes syndrome.  ¨ Ask your doctor about other medications that can be prescribed to relieve painful urination.  ¨ Give your child plenty of fluids to drink. Cranberry juice may help relieve some pain symptoms.  When you should call your healthcare provider  Call the doctor if your child has any of the following:  · Symptoms that do not improve within 48 hours of starting treatment  · Fever (see Fever and children, below)  · A fever that goes away but returns after starting treatment  · Increased abdominal or back pain  · Signs of dehydration (very dark or little urine, excessive thirst, dry mouth, dizziness)  · Vomiting or inability to tolerate prescribed antibiotics  · Child begins acting sicker  · If a urine culture was done, make sure to get the results from the healthcare provider. Make an appointment to follow up about a week after your child has finished antibiotics.  Fever and children  Always use a digital thermometer to check your childs temperature. Never use a mercury thermometer.  For infants and toddlers, be sure to use a rectal thermometer correctly. A rectal thermometer may accidentally poke a hole in (perforate) the rectum. It may also pass on germs from the stool. Always follow the product makers directions for proper use. If you dont feel comfortable taking a rectal temperature, use another method. When you talk to your childs healthcare provider, tell him or her which method you used to take your childs temperature.  Here are guidelines for fever temperature. Ear temperatures arent accurate before 6 months of age. Dont take an oral temperature until your child is at least 4 years old.  Infant under 3 months old:  · Ask your childs healthcare provider how you should take the temperature.  · Rectal or forehead (temporal artery) temperature of 100.4°F (38°C) or higher, or as directed by the provider  · Armpit  temperature of 99°F (37.2°C) or higher, or as directed by the provider  Child age 3 to 36 months:  · Rectal, forehead (temporal artery), or ear temperature of 102°F (38.9°C) or higher, or as directed by the provider  · Armpit temperature of 101°F (38.3°C) or higher, or as directed by the provider  Child of any age:  · Repeated temperature of 104°F (40°C) or higher, or as directed by the provider  · Fever that lasts more than 24 hours in a child under 2 years old. Or a fever that lasts for 3 days in a child 2 years or older.      How is a urinary tract infection prevented?  · Encourage your child to drink plenty of fluids.  · Encourage your child to empty the bladder all the way when urinating.  · Teach girls to wipe from the front to back when using the bathroom.  · Don't use bubble bath.  · Don't allow your child to become constipated.  · If your child has a UTI, he or she may need ultrasound imaging of the kidneys and bladder. This helps the doctor rule out possible anatomical problems that could cause a UTI. If problems are found, or if your child has recurrent UTIs, additional imaging tests may be helpful.  Date Last Reviewed: 1/1/2017 © 2000-2017 [employer]. [address] [place] [address], [place] [address]. All rights reserved. This information is not intended as a substitute for professional medical care. Always follow your healthcare professional's instructions.

## 2017-09-27 RX ORDER — BRIMONIDINE TARTRATE 1.5 MG/ML
1 SOLUTION/ DROPS OPHTHALMIC 2 TIMES DAILY
Refills: 3 | OUTPATIENT
Start: 2017-09-27

## 2017-09-27 RX ORDER — DIFLUPREDNATE OPHTHALMIC 0.5 MG/ML
1 EMULSION OPHTHALMIC 2 TIMES DAILY
OUTPATIENT
Start: 2017-09-27

## 2017-09-27 RX ORDER — DORZOLAMIDE HYDROCHLORIDE AND TIMOLOL MALEATE 20; 5 MG/ML; MG/ML
1 SOLUTION/ DROPS OPHTHALMIC 2 TIMES DAILY
OUTPATIENT
Start: 2017-09-27

## 2017-09-27 RX ORDER — GABAPENTIN 100 MG/1
CAPSULE ORAL
Qty: 150 CAPSULE | Refills: 5 | Status: SHIPPED | OUTPATIENT
Start: 2017-09-27 | End: 2017-12-12 | Stop reason: SDUPTHER

## 2017-09-27 RX ORDER — LATANOPROST 50 UG/ML
1 SOLUTION/ DROPS OPHTHALMIC NIGHTLY
Qty: 2.5 ML | Refills: 0 | OUTPATIENT
Start: 2017-09-27

## 2017-09-28 LAB
BACTERIA BLD CULT: NORMAL
BACTERIA BLD CULT: NORMAL

## 2017-09-29 DIAGNOSIS — N39.0 E. COLI UTI: Primary | ICD-10-CM

## 2017-09-29 DIAGNOSIS — B96.20 E. COLI UTI: Primary | ICD-10-CM

## 2017-10-02 ENCOUNTER — LAB VISIT (OUTPATIENT)
Dept: LAB | Facility: HOSPITAL | Age: 82
End: 2017-10-02
Attending: INTERNAL MEDICINE
Payer: MEDICARE

## 2017-10-02 ENCOUNTER — OFFICE VISIT (OUTPATIENT)
Dept: PRIMARY CARE CLINIC | Facility: CLINIC | Age: 82
End: 2017-10-02
Payer: MEDICARE

## 2017-10-02 VITALS
HEART RATE: 68 BPM | BODY MASS INDEX: 20.23 KG/M2 | SYSTOLIC BLOOD PRESSURE: 128 MMHG | WEIGHT: 129.19 LBS | DIASTOLIC BLOOD PRESSURE: 74 MMHG

## 2017-10-02 DIAGNOSIS — B96.20 E. COLI UTI (URINARY TRACT INFECTION): Primary | ICD-10-CM

## 2017-10-02 DIAGNOSIS — N39.0 E. COLI UTI: ICD-10-CM

## 2017-10-02 DIAGNOSIS — B96.20 E. COLI UTI: ICD-10-CM

## 2017-10-02 DIAGNOSIS — N39.0 E. COLI UTI (URINARY TRACT INFECTION): Primary | ICD-10-CM

## 2017-10-02 PROBLEM — R65.10 SIRS (SYSTEMIC INFLAMMATORY RESPONSE SYNDROME): Status: RESOLVED | Noted: 2017-09-22 | Resolved: 2017-10-02

## 2017-10-02 PROBLEM — F01.518 VASCULAR DEMENTIA WITH BEHAVIOR DISTURBANCE: Status: RESOLVED | Noted: 2017-05-31 | Resolved: 2017-10-02

## 2017-10-02 PROBLEM — G93.40 ACUTE ENCEPHALOPATHY: Status: RESOLVED | Noted: 2017-09-22 | Resolved: 2017-10-02

## 2017-10-02 PROBLEM — N30.00 ACUTE CYSTITIS WITHOUT HEMATURIA: Status: RESOLVED | Noted: 2017-09-22 | Resolved: 2017-10-02

## 2017-10-02 LAB
BILIRUB UR QL STRIP: NEGATIVE
CLARITY UR: CLEAR
COLOR UR: YELLOW
GLUCOSE UR QL STRIP: NEGATIVE
HGB UR QL STRIP: NEGATIVE
KETONES UR QL STRIP: NEGATIVE
LEUKOCYTE ESTERASE UR QL STRIP: NEGATIVE
NITRITE UR QL STRIP: NEGATIVE
PH UR STRIP: 6 [PH] (ref 5–8)
PROT UR QL STRIP: NEGATIVE
SP GR UR STRIP: 1.02 (ref 1–1.03)
URN SPEC COLLECT METH UR: NORMAL
UROBILINOGEN UR STRIP-ACNC: NEGATIVE EU/DL

## 2017-10-02 PROCEDURE — 87086 URINE CULTURE/COLONY COUNT: CPT

## 2017-10-02 PROCEDURE — 99999 PR PBB SHADOW E&M-EST. PATIENT-LVL III: CPT | Mod: PBBFAC,,, | Performed by: INTERNAL MEDICINE

## 2017-10-02 PROCEDURE — 81003 URINALYSIS AUTO W/O SCOPE: CPT

## 2017-10-02 PROCEDURE — 99496 TRANSJ CARE MGMT HIGH F2F 7D: CPT | Mod: S$GLB,,, | Performed by: INTERNAL MEDICINE

## 2017-10-02 NOTE — PROGRESS NOTES
[facility]  New Visit Progress Note   Recent Hospital Discharge     PRESENTING HISTORY     Chief Complaint/Reason for Admission:  Follow up Hospital Discharge   Hospital Follow Up    PCP: Lily Valladares MD    History of Present Illness:  Mr. Mika Valenzuela is a 81 y.o. male who was recently admitted to the hospital.    ___________________________________________________________________    Today:  Presents to [facility] for hospital follow up.  Recently hospitalized for sepsis related to E coli UTI.  Responded well to supportive care.  Discharged to home to complete course of outpatient cipro.    Review of records show two ED visits for AMS in past 3 months. During the first ED visit urine was not collected. The second ED visit shows urine culture with growth of E coli, but low CFU. Was not treated.    Patient has a history of BPH and is on Flomax. Follows with urology, Dr Archer; last seen 9/2016.   Also has remote hx renal stone, though no stone evident on last renal imaging in Feb 2015.     Patient with advanced dementia. Pleasant and cooperative, but unable to provide any reliable history. Oriented only to self. Accompanied by his sister today. She organizes his medication for him; she tells me he has been taking Cipro and all other medications as prescribed. Tells me his cogitative status and behavior are both at baseline. Patient lives with their other sister who provides full time supervision.     Per patients sister- he is tolerating oral intake, eating well. No N/V. No fever. No complaints of pain. No reports of dysuria.     Review of Systems  ROS obtained from patients sister due to his advanced dementia.  ROS NEG per sister.   General ROS: negative for chills, fever or weight loss  Psychological ROS: negative for hallucination, depression or suicidal ideation  Ophthalmic ROS: negative for blurry vision, photophobia or eye pain  ENT ROS: negative for epistaxis, sore throat or  rhinorrhea  Respiratory ROS: no cough, shortness of breath, or wheezing  Cardiovascular ROS: no chest pain or dyspnea on exertion  Gastrointestinal ROS: no abdominal pain, change in bowel habits, or black/ bloody stools  Genito-Urinary ROS: no dysuria, trouble voiding, or hematuria  Musculoskeletal ROS: negative for gait disturbance or muscular weakness  Neurological ROS: no syncope or seizures; no ataxia  Dermatological ROS: negative for pruritis, rash and jaundice          PAST HISTORY:     Past Medical History:   Diagnosis Date    Anemia     Back pain     BPH (benign prostatic hypertrophy)     Chronic constipation     Colon polyp     Diverticulosis     GERD (gastroesophageal reflux disease)     Glaucoma (increased eye pressure)     Hepatitis C     Hypertension     Kidney stone     Myeloma     Urinary tract infection        Past Surgical History:   Procedure Laterality Date    carotid endarterectomy      left    COLON SURGERY         Family History   Problem Relation Age of Onset    Prostate cancer Neg Hx     Kidney disease Neg Hx        Social History     Social History    Marital status:      Spouse name: N/A    Number of children: N/A    Years of education: N/A     Social History Main Topics    Smoking status: Former Smoker     Packs/day: 0.25     Quit date: 7/19/1982    Smokeless tobacco: Never Used    Alcohol use No    Drug use: No    Sexual activity: Not Currently     Partners: Female     Other Topics Concern    Not on file     Social History Narrative    No narrative on file       MEDICATIONS & ALLERGIES:     Current Outpatient Prescriptions on File Prior to Visit   Medication Sig Dispense Refill    acyclovir (ZOVIRAX) 400 MG tablet Take 1 tablet (400 mg total) by mouth once daily. 90 tablet 3    amlodipine (NORVASC) 10 MG tablet Take 1 tablet (10 mg total) by mouth once daily. 90 tablet 3    atenolol (TENORMIN) 50 MG tablet Take 1 tablet (50 mg total) by mouth once  daily. 90 tablet 3    bortezomib (VELCADE) 3.5 mg injection Inject 1.3 mg/m2 into the skin as directed. Velcade days 1,4,8, and 11 every 28 days      brimonidine 0.15 % OPTH DROP (ALPHAGAN) 0.15 % ophthalmic solution Place 1 drop into both eyes 2 (two) times daily.  3    ciprofloxacin HCl (CIPRO) 500 MG tablet Take 1 tablet (500 mg total) by mouth 2 (two) times daily. 14 tablet 0    dexamethasone (DECADRON) 4 MG Tab Take 20 mg by mouth as directed. Take 20mg of Dexamethasone PO with Velcade injections      difluprednate (DUREZOL) 0.05 % Drop ophthalmic solution Place 1 drop into both eyes 2 (two) times daily.      dorzolamide-timolol 2-0.5% (COSOPT) 2-0.5 % ophthalmic solution Place 1 drop into both eyes 2 (two) times daily.       gabapentin (NEURONTIN) 100 MG capsule TAKE 2 CAPSULES EVERY MORNING, 1 CAP MID DAY, THEN 2 CAPS EVERY EVENING 150 capsule 5    latanoprost (XALATAN) 0.005 % ophthalmic solution Place 1 drop into both eyes every evening.       memantine (NAMENDA) 5 MG Tab Take 1 tablet (5 mg total) by mouth once daily. 30 tablet 5    tamsulosin (FLOMAX) 0.4 mg Cp24 Take 1 capsule (0.4 mg total) by mouth once daily. 30 capsule 11    venlafaxine (EFFEXOR-XR) 37.5 MG 24 hr capsule TAKE 1 CAPSULE (37.5 MG TOTAL) BY MOUTH ONCE DAILY. 30 capsule 3     No current facility-administered medications on file prior to visit.         Review of patient's allergies indicates:  No Known Allergies    OBJECTIVE:     Vital Signs:  Vitals:    10/02/17 1146   BP: 128/74   Pulse: 68     Wt Readings from Last 1 Encounters:   09/23/17 0025 57.7 kg (127 lb 3.3 oz)   09/22/17 2132 63.5 kg (140 lb)     Body mass index is 20.23 kg/m².       Physical Exam:  /74 (BP Location: Right arm, Patient Position: Sitting, BP Method: Small (Automatic))   Pulse 68   Wt 58.6 kg (129 lb 3 oz)   BMI 20.23 kg/m²   General appearance: alert, cooperative, no distress  Constitutional:Oriented to person only +appears well-developed and  well-nourished.   HEENT: Normocephalic, atraumatic, neck symmetrical, no nasal discharge   Lungs: clear to auscultation bilaterally, no dullness to percussion bilaterally  Heart: regular rate and rhythm without rub; no displacement of the PMI   Abdomen: soft, non-tender; bowel sounds normoactive; no organomegaly  Extremities: extremities symmetric; no clubbing, cyanosis, or edema  Integument: Skin color, texture, turgor normal; no rashes; hair distrubution normal  Neurologic: Alert and oriented X 3, normal strength, normal coordination and gait  Psychiatric: no pressured speech; normal affect; + impaired cognition - unable to answer basic questions or provide reliable history     Laboratory  Lab Results   Component Value Date    WBC 5.71 09/25/2017    HGB 10.4 (L) 09/25/2017    HCT 30.6 (L) 09/25/2017    MCV 96 09/25/2017     09/25/2017     BMP  Lab Results   Component Value Date     09/25/2017    K 3.5 09/25/2017     09/25/2017    CO2 25 09/25/2017    BUN 13 09/25/2017    CREATININE 0.8 09/25/2017    CALCIUM 9.5 09/25/2017    ANIONGAP 12 09/25/2017    ESTGFRAFRICA >60 09/25/2017    EGFRNONAA >60 09/25/2017     Lab Results   Component Value Date    ALT 17 09/25/2017    AST 58 (H) 09/25/2017    ALKPHOS 72 09/25/2017    BILITOT 0.3 09/25/2017     Lab Results   Component Value Date    INR 1.1 03/18/2016    INR 1.0 07/27/2015    INR 1.0 05/29/2015     Lab Results   Component Value Date    HGBA1C 5.2 12/29/2014     No results for input(s): POCTGLUCOSE in the last 72 hours.      TRANSITION OF CARE:     [facility] On Call Contact Note: 10/2/17    Family and/or Caretaker present at visit?  Yes.  Diagnostic tests reviewed/disposition: I have reviewed all completed as well as pending diagnostic tests at the time of discharge.  Disease/illness education: Yes  Home health/community services discussion/referrals: Patient has home health established at Home. .   Establishment or re-establishment of referral orders  for community resources: No other necessary community resources.   Discussion with other health care providers: No discussion with other health care providers necessary.     ASSESSMENT & PLAN:       E. coli UTI (urinary tract infection)  - recent hospital admission for sepsis related to E coli UTI  - completing a course of Cipro at present  - will obtain Renal US to assess for possible underlying structural abnormality   - repeat urine and cx pending today  -     US Retroperitoneal Complete (Kidney and; Future; Expected date: 10/02/2017      I will see [facility] again in [facility] 10/13/17 following US.   Instructions for the patient:      Scheduled Follow-up :  Future Appointments  Date Time Provider Department Center   10/5/2017 11:00 AM Curly Ellsion MD [facility] HEM ONC [facility]   10/13/2017 9:30 AM [facility] US2 [facility] USOUNDO [facility]   10/13/2017 10:30 AM Dania Beltrán MD [facility] IM ROSENDA [facility]   1/18/2018 7:45 AM [facility] [place]   1/22/2018 8:40 AM Lily Valladares MD [facility] IM [place]   2/9/2018 10:20 AM Conrad Grewal MD [facility] NEURO [place]       Post Visit Medication List:     Medication List          Accurate as of 10/2/17 12:22 PM. If you have any questions, ask your nurse or doctor.               CONTINUE taking these medications    acyclovir 400 MG tablet  Commonly known as:  ZOVIRAX  Take 1 tablet (400 mg total) by mouth once daily.     amlodipine 10 MG tablet  Commonly known as:  NORVASC  Take 1 tablet (10 mg total) by mouth once daily.     atenolol 50 MG tablet  Commonly known as:  TENORMIN  Take 1 tablet (50 mg total) by mouth once daily.     bortezomib 3.5 mg injection  Commonly known as:  VELCADE     brimonidine 0.15 % OPTH DROP 0.15 % ophthalmic solution  Commonly known as:  ALPHAGAN     ciprofloxacin HCl 500 MG tablet  Commonly known as:  CIPRO  Take 1 tablet (500 mg total) by mouth 2 (two) times daily.     dexamethasone 4 MG Tab  Commonly known as:  DECADRON      dorzolamide-timolol 2-0.5% 22.3-6.8 mg/mL ophthalmic solution  Commonly known as:  COSOPT     DUREZOL 0.05 % Drop ophthalmic solution  Generic drug:  difluprednate     gabapentin 100 MG capsule  Commonly known as:  NEURONTIN  TAKE 2 CAPSULES EVERY MORNING, 1 CAP MID DAY, THEN 2 CAPS EVERY EVENING     latanoprost 0.005 % ophthalmic solution     memantine 5 MG Tab  Commonly known as:  NAMENDA  Take 1 tablet (5 mg total) by mouth once daily.     tamsulosin 0.4 mg Cp24  Commonly known as:  FLOMAX  Take 1 capsule (0.4 mg total) by mouth once daily.     venlafaxine 37.5 MG 24 hr capsule  Commonly known as:  EFFEXOR-XR  TAKE 1 CAPSULE (37.5 MG TOTAL) BY MOUTH ONCE DAILY.            Signing Physician:  Dania Beltrán MD

## 2017-10-03 LAB — BACTERIA UR CULT: NO GROWTH

## 2017-10-05 ENCOUNTER — OFFICE VISIT (OUTPATIENT)
Dept: HEMATOLOGY/ONCOLOGY | Facility: CLINIC | Age: 82
End: 2017-10-05
Payer: MEDICARE

## 2017-10-05 VITALS
HEART RATE: 66 BPM | HEIGHT: 66 IN | OXYGEN SATURATION: 97 % | WEIGHT: 131.19 LBS | BODY MASS INDEX: 21.08 KG/M2 | SYSTOLIC BLOOD PRESSURE: 102 MMHG | DIASTOLIC BLOOD PRESSURE: 66 MMHG

## 2017-10-05 DIAGNOSIS — F01.50 VASCULAR DEMENTIA WITHOUT BEHAVIORAL DISTURBANCE: ICD-10-CM

## 2017-10-05 DIAGNOSIS — C90.00 IGA MYELOMA: Primary | ICD-10-CM

## 2017-10-05 DIAGNOSIS — G89.29 CHRONIC MIDLINE LOW BACK PAIN WITHOUT SCIATICA: ICD-10-CM

## 2017-10-05 DIAGNOSIS — M54.50 CHRONIC MIDLINE LOW BACK PAIN WITHOUT SCIATICA: ICD-10-CM

## 2017-10-05 DIAGNOSIS — D63.8 ANEMIA OF CHRONIC DISEASE: ICD-10-CM

## 2017-10-05 PROCEDURE — 99999 PR PBB SHADOW E&M-EST. PATIENT-LVL III: CPT | Mod: PBBFAC,,, | Performed by: INTERNAL MEDICINE

## 2017-10-05 PROCEDURE — 99214 OFFICE O/P EST MOD 30 MIN: CPT | Mod: S$GLB,,, | Performed by: INTERNAL MEDICINE

## 2017-10-05 RX ORDER — TAMSULOSIN HYDROCHLORIDE 0.4 MG/1
CAPSULE ORAL
Qty: 30 CAPSULE | Refills: 9 | Status: ON HOLD | OUTPATIENT
Start: 2017-10-05 | End: 2017-12-27 | Stop reason: HOSPADM

## 2017-10-05 NOTE — PROGRESS NOTES
Subjective:       Patient ID: Mika Valenzuela is a 81 y.o. male.    Chief Complaint: Multiple Myeloma    HPI     Here for f/u of IgA kappa multiple myeloma.    Accompanied by his sister, Poly Guerrero. He has 2 daughters and they live in [place].    He was treated with Velcade and dexamethasone for his myeloma from September 2014 until December 2014 and got the chemotherapy break.  He received a second course of treatment from March 2016 until February 2017.    He has not received any treatment for his myeloma since February 2017.  After that he was diagnosed with dementia.  He is currently on Effexor and Namenda.  He is accompanied to the clinic by his sister.  He lives with another sister.      At this time he is able to take care of his basic needs.  The sister feels his dementia is worsening and they will eventually have to put him in a nursing home.    Review of Systems   Unable to perform ROS: Dementia       Objective:      Physical Exam   Constitutional: He appears well-developed and well-nourished. No distress.   Eyes: Conjunctivae and lids are normal. No scleral icterus.   Neck: Normal range of motion. Neck supple. No thyromegaly present.   Cardiovascular: Normal rate, regular rhythm and intact distal pulses.  Exam reveals no gallop.    Pulmonary/Chest: Effort normal and breath sounds normal. No respiratory distress. He has no rales.   Abdominal: Soft. Bowel sounds are normal. He exhibits no distension and no mass. There is no hepatosplenomegaly.   Lymphadenopathy:        Head (right side): No submental adenopathy present.        Head (left side): No submental adenopathy present.     He has no cervical adenopathy.        Right: No supraclavicular adenopathy present.        Left: No supraclavicular adenopathy present.   Neurological: He is alert.   Skin: Skin is warm. He is not diaphoretic. No cyanosis. Nails show no clubbing.         Assessment:     1. IgA myeloma    2. Chronic midline low back pain without  sciatica    3. Vascular dementia without behavioral disturbance    4. Anemia of chronic disease      Plan:   His last chemotherapy treatment for his myeloma was on February 10, 2017.    Myeloma parameters are worsening but there is no evidence of impending end organ damage.    However, he is not a candidate for any further active myeloma therapy given his underlying and worsening dementia.    Continue to follow-up with neurology.    Will check labs and see him back for follow-up in 4 months.

## 2017-10-13 ENCOUNTER — OFFICE VISIT (OUTPATIENT)
Dept: PRIMARY CARE CLINIC | Facility: CLINIC | Age: 82
End: 2017-10-13
Payer: MEDICARE

## 2017-10-13 ENCOUNTER — HOSPITAL ENCOUNTER (OUTPATIENT)
Dept: RADIOLOGY | Facility: HOSPITAL | Age: 82
Discharge: HOME | End: 2017-10-13
Attending: INTERNAL MEDICINE
Payer: MEDICARE

## 2017-10-13 VITALS
DIASTOLIC BLOOD PRESSURE: 64 MMHG | WEIGHT: 135.94 LBS | TEMPERATURE: 97 F | BODY MASS INDEX: 21.94 KG/M2 | SYSTOLIC BLOOD PRESSURE: 128 MMHG | HEART RATE: 52 BPM

## 2017-10-13 DIAGNOSIS — B96.20 E. COLI UTI (URINARY TRACT INFECTION): ICD-10-CM

## 2017-10-13 DIAGNOSIS — N30.01 ACUTE CYSTITIS WITH HEMATURIA: Primary | ICD-10-CM

## 2017-10-13 DIAGNOSIS — N39.0 E. COLI UTI (URINARY TRACT INFECTION): ICD-10-CM

## 2017-10-13 DIAGNOSIS — N40.0 BENIGN PROSTATIC HYPERPLASIA, UNSPECIFIED WHETHER LOWER URINARY TRACT SYMPTOMS PRESENT: ICD-10-CM

## 2017-10-13 PROBLEM — A41.9 SEPSIS: Status: RESOLVED | Noted: 2017-09-22 | Resolved: 2017-10-13

## 2017-10-13 PROCEDURE — 76770 US EXAM ABDO BACK WALL COMP: CPT | Mod: 26,,, | Performed by: RADIOLOGY

## 2017-10-13 PROCEDURE — 76770 US EXAM ABDO BACK WALL COMP: CPT | Mod: TC

## 2017-10-13 PROCEDURE — 99214 OFFICE O/P EST MOD 30 MIN: CPT | Mod: S$GLB,,, | Performed by: INTERNAL MEDICINE

## 2017-10-13 PROCEDURE — 99999 PR PBB SHADOW E&M-EST. PATIENT-LVL III: CPT | Mod: PBBFAC,,, | Performed by: INTERNAL MEDICINE

## 2017-10-13 NOTE — PROGRESS NOTES
Subjective:       Patient ID: Mika Valenzuela is a 81 y.o. male.    Chief Complaint: Hospital Follow Up    HPI:  Returns to [facility] for continued hospital follow up.  Recently hospitalized for sepsis related to E coli UTI.  Responded well to supportive care.  Discharged to home to complete course of outpatient cipro.    Review of records show two ED visits for AMS in past 3 months. During the first ED visit urine was not collected. The second ED visit shows urine culture with growth of E coli, but low CFU. Was not treated.    Urine culture collected last clinic visit has revealed no growth.  Renal US shows two cysts, but no significant structural abnormality thought to be contributing to UTI's.      Patient has a history of BPH and is on Flomax. Follows with urology, Dr Archer; last seen 9/2016.   Patients sister, who organizes his medication for him, tells me he is compliant with Flomax.   Patient with advanced dementia.  Accompanied by his sister who tells me his cogitative status and behavior are both at baseline. Patient lives with their other sister who provides full time supervision.       Review of Systems  General ROS: negative for chills, fever or weight loss  Psychological ROS: negative for hallucination, depression or suicidal ideation  Ophthalmic ROS: negative for blurry vision, photophobia or eye pain  ENT ROS: negative for epistaxis, sore throat or rhinorrhea  Respiratory ROS: no cough, shortness of breath, or wheezing  Cardiovascular ROS: no chest pain or dyspnea on exertion  Gastrointestinal ROS: no abdominal pain, change in bowel habits, or black/ bloody stools  Genito-Urinary ROS: no dysuria, trouble voiding, or hematuria  Musculoskeletal ROS: negative for gait disturbance or muscular weakness  Neurological ROS: no syncope or seizures; no ataxia  Dermatological ROS: negative for pruritis, rash and jaundice        Objective:      Vitals:    10/13/17 1001   BP: 128/64   Pulse: (!) 52   Temp:  97.2 °F (36.2 °C)     Physical Exam:  General appearance: alert, cooperative, no distress  Constitutional:Oriented to person, place, and time +appears well-developed and well-nourished.   HEENT: Normocephalic, atraumatic, neck symmetrical, no nasal discharge   + poor dentition   Eyes: conjunctivae/corneas clear, PERRL, EOM's intact  Lungs: clear to auscultation bilaterally, no dullness to percussion bilaterally  Heart: regular rate and rhythm without rub; no displacement of the PMI   Abdomen: soft, non-tender; bowel sounds normoactive; no organomegaly  Extremities: extremities symmetric; no clubbing, cyanosis, or edema  Integument: Skin color, texture, turgor normal; no rashes; hair distrubution normal  Neurologic: Alert and oriented to self only, normal strength, normal coordination and gait  Psychiatric: no pressured speech; normal affect    Renal US: 10/2/17  Mildly elevated resistive indices which is nonspecific but may be seen with medical renal disease.  Two left renal simple cyst.    Assessment:       1. Acute cystitis with hematuria    2. Benign prostatic hyperplasia, unspecified whether lower urinary tract symptoms present        Plan:       Diagnoses and all orders for this visit:    Acute cystitis with hematuria  - recent hospital admission for sepsis related to E coli UTI  - completed course of Cipro  - condition has resolved  - follow up UA and Urine Cx without evidence of ongoing infection    Benign prostatic hyperplasia, unspecified whether lower urinary tract symptoms present  - had urology evaluation 9/2016; Flomax initiated  - Renal US today unremarkable  - if patient has recurrence of UTI, I recommend repeat urology evaluation       Patient will be discharged from [facility].  He has FU appointment with his PCP, Dr Lily Valladares, 1/22/2018.

## 2017-11-30 DIAGNOSIS — I10 ESSENTIAL HYPERTENSION: ICD-10-CM

## 2017-11-30 RX ORDER — ATENOLOL 50 MG/1
50 TABLET ORAL DAILY
Qty: 90 TABLET | Refills: 0 | Status: ON HOLD | OUTPATIENT
Start: 2017-11-30 | End: 2017-12-27 | Stop reason: HOSPADM

## 2017-12-12 DIAGNOSIS — S32.050S COMPRESSION FRACTURE OF L5 LUMBAR VERTEBRA, SEQUELA: ICD-10-CM

## 2017-12-12 DIAGNOSIS — M54.50 CHRONIC MIDLINE LOW BACK PAIN WITHOUT SCIATICA: ICD-10-CM

## 2017-12-12 DIAGNOSIS — G89.29 CHRONIC MIDLINE LOW BACK PAIN WITHOUT SCIATICA: ICD-10-CM

## 2017-12-12 RX ORDER — GABAPENTIN 100 MG/1
CAPSULE ORAL
Qty: 150 CAPSULE | Refills: 0 | Status: ON HOLD | OUTPATIENT
Start: 2017-12-12 | End: 2017-12-27 | Stop reason: HOSPADM

## 2017-12-22 ENCOUNTER — HOSPITAL ENCOUNTER (INPATIENT)
Facility: HOSPITAL | Age: 82
LOS: 3 days | Discharge: HOSPICE | DRG: 872 | End: 2017-12-27
Attending: EMERGENCY MEDICINE | Admitting: INTERNAL MEDICINE
Payer: MEDICARE

## 2017-12-22 DIAGNOSIS — N40.0 BENIGN PROSTATIC HYPERPLASIA WITHOUT LOWER URINARY TRACT SYMPTOMS: ICD-10-CM

## 2017-12-22 DIAGNOSIS — F01.50 VASCULAR DEMENTIA WITHOUT BEHAVIORAL DISTURBANCE: ICD-10-CM

## 2017-12-22 DIAGNOSIS — E87.0 ACUTE HYPERNATREMIA: ICD-10-CM

## 2017-12-22 DIAGNOSIS — K59.00 CONSTIPATION, UNSPECIFIED CONSTIPATION TYPE: ICD-10-CM

## 2017-12-22 DIAGNOSIS — D63.8 ANEMIA OF CHRONIC DISEASE: ICD-10-CM

## 2017-12-22 DIAGNOSIS — E83.52 HYPERCALCEMIA OF MALIGNANCY: ICD-10-CM

## 2017-12-22 DIAGNOSIS — E86.0 DEHYDRATION: ICD-10-CM

## 2017-12-22 DIAGNOSIS — R00.0 TACHYCARDIA: ICD-10-CM

## 2017-12-22 DIAGNOSIS — R53.83 FATIGUE, UNSPECIFIED TYPE: ICD-10-CM

## 2017-12-22 DIAGNOSIS — A41.9 SEVERE SEPSIS: ICD-10-CM

## 2017-12-22 DIAGNOSIS — R53.1 WEAKNESS: ICD-10-CM

## 2017-12-22 DIAGNOSIS — E83.52 HYPERCALCEMIA: ICD-10-CM

## 2017-12-22 DIAGNOSIS — C90.00 IGA MYELOMA: ICD-10-CM

## 2017-12-22 DIAGNOSIS — R62.7 FAILURE TO THRIVE IN ADULT: Primary | ICD-10-CM

## 2017-12-22 DIAGNOSIS — I10 ESSENTIAL HYPERTENSION: ICD-10-CM

## 2017-12-22 DIAGNOSIS — D51.9 ANEMIA DUE TO VITAMIN B12 DEFICIENCY, UNSPECIFIED B12 DEFICIENCY TYPE: ICD-10-CM

## 2017-12-22 DIAGNOSIS — R65.20 SEVERE SEPSIS: ICD-10-CM

## 2017-12-22 PROBLEM — K56.41 FECAL IMPACTION IN RECTUM: Status: ACTIVE | Noted: 2017-12-22

## 2017-12-22 PROBLEM — N39.0 UTI (URINARY TRACT INFECTION): Status: RESOLVED | Noted: 2017-09-22 | Resolved: 2017-12-22

## 2017-12-22 LAB
ALBUMIN SERPL BCP-MCNC: 3.2 G/DL
ALP SERPL-CCNC: 108 U/L
ALT SERPL W/O P-5'-P-CCNC: 12 U/L
AMMONIA PLAS-SCNC: 35 UMOL/L
ANION GAP SERPL CALC-SCNC: 16 MMOL/L
APTT BLDCRRT: 32.4 SEC
AST SERPL-CCNC: 24 U/L
BASOPHILS # BLD AUTO: 0.01 K/UL
BASOPHILS NFR BLD: 0.2 %
BILIRUB SERPL-MCNC: 1.1 MG/DL
BILIRUB UR QL STRIP: NEGATIVE
BNP SERPL-MCNC: 223 PG/ML
BUN SERPL-MCNC: 22 MG/DL
CALCIUM SERPL-MCNC: 12.1 MG/DL
CHLORIDE SERPL-SCNC: 108 MMOL/L
CLARITY UR: CLEAR
CO2 SERPL-SCNC: 25 MMOL/L
COLOR UR: YELLOW
CREAT SERPL-MCNC: 1.1 MG/DL
DIFFERENTIAL METHOD: ABNORMAL
EOSINOPHIL # BLD AUTO: 0 K/UL
EOSINOPHIL NFR BLD: 0.2 %
ERYTHROCYTE [DISTWIDTH] IN BLOOD BY AUTOMATED COUNT: 15 %
EST. GFR  (AFRICAN AMERICAN): >60 ML/MIN/1.73 M^2
EST. GFR  (NON AFRICAN AMERICAN): >60 ML/MIN/1.73 M^2
ETHANOL SERPL-MCNC: <10 MG/DL
GLUCOSE SERPL-MCNC: 85 MG/DL
GLUCOSE UR QL STRIP: NEGATIVE
HCT VFR BLD AUTO: 33.8 %
HGB BLD-MCNC: 11.1 G/DL
HGB UR QL STRIP: ABNORMAL
INR PPP: 1.1
KETONES UR QL STRIP: NEGATIVE
LACTATE SERPL-SCNC: 2.9 MMOL/L
LEUKOCYTE ESTERASE UR QL STRIP: NEGATIVE
LIPASE SERPL-CCNC: 42 U/L
LYMPHOCYTES # BLD AUTO: 1.8 K/UL
LYMPHOCYTES NFR BLD: 28.8 %
MAGNESIUM SERPL-MCNC: 1.9 MG/DL
MCH RBC QN AUTO: 32.8 PG
MCHC RBC AUTO-ENTMCNC: 32.8 G/DL
MCV RBC AUTO: 100 FL
MONOCYTES # BLD AUTO: 0.7 K/UL
MONOCYTES NFR BLD: 11.4 %
NEUTROPHILS # BLD AUTO: 3.7 K/UL
NEUTROPHILS NFR BLD: 59.4 %
NITRITE UR QL STRIP: NEGATIVE
PH UR STRIP: 7 [PH] (ref 5–8)
PLATELET # BLD AUTO: 170 K/UL
PMV BLD AUTO: 9.6 FL
POCT GLUCOSE: 97 MG/DL (ref 70–110)
POTASSIUM SERPL-SCNC: 4.4 MMOL/L
PROT SERPL-MCNC: 11.5 G/DL
PROT UR QL STRIP: NEGATIVE
PROTHROMBIN TIME: 12 SEC
RBC # BLD AUTO: 3.38 M/UL
SODIUM SERPL-SCNC: 149 MMOL/L
SP GR UR STRIP: 1.01 (ref 1–1.03)
TROPONIN I SERPL DL<=0.01 NG/ML-MCNC: 0.01 NG/ML
URN SPEC COLLECT METH UR: ABNORMAL
UROBILINOGEN UR STRIP-ACNC: ABNORMAL EU/DL
WBC # BLD AUTO: 6.14 K/UL

## 2017-12-22 PROCEDURE — 85730 THROMBOPLASTIN TIME PARTIAL: CPT

## 2017-12-22 PROCEDURE — 85025 COMPLETE CBC W/AUTO DIFF WBC: CPT

## 2017-12-22 PROCEDURE — 84484 ASSAY OF TROPONIN QUANT: CPT

## 2017-12-22 PROCEDURE — 93005 ELECTROCARDIOGRAM TRACING: CPT

## 2017-12-22 PROCEDURE — 82962 GLUCOSE BLOOD TEST: CPT

## 2017-12-22 PROCEDURE — 63600175 PHARM REV CODE 636 W HCPCS: Performed by: EMERGENCY MEDICINE

## 2017-12-22 PROCEDURE — 80320 DRUG SCREEN QUANTALCOHOLS: CPT

## 2017-12-22 PROCEDURE — 83605 ASSAY OF LACTIC ACID: CPT

## 2017-12-22 PROCEDURE — 96361 HYDRATE IV INFUSION ADD-ON: CPT

## 2017-12-22 PROCEDURE — 85610 PROTHROMBIN TIME: CPT

## 2017-12-22 PROCEDURE — 83735 ASSAY OF MAGNESIUM: CPT

## 2017-12-22 PROCEDURE — G0378 HOSPITAL OBSERVATION PER HR: HCPCS

## 2017-12-22 PROCEDURE — 80053 COMPREHEN METABOLIC PANEL: CPT

## 2017-12-22 PROCEDURE — 25500020 PHARM REV CODE 255: Performed by: EMERGENCY MEDICINE

## 2017-12-22 PROCEDURE — 83880 ASSAY OF NATRIURETIC PEPTIDE: CPT

## 2017-12-22 PROCEDURE — 11000001 HC ACUTE MED/SURG PRIVATE ROOM

## 2017-12-22 PROCEDURE — 83690 ASSAY OF LIPASE: CPT

## 2017-12-22 PROCEDURE — 82140 ASSAY OF AMMONIA: CPT

## 2017-12-22 PROCEDURE — 99285 EMERGENCY DEPT VISIT HI MDM: CPT | Mod: 25

## 2017-12-22 PROCEDURE — 96375 TX/PRO/DX INJ NEW DRUG ADDON: CPT

## 2017-12-22 PROCEDURE — 81003 URINALYSIS AUTO W/O SCOPE: CPT

## 2017-12-22 PROCEDURE — 25000003 PHARM REV CODE 250: Performed by: EMERGENCY MEDICINE

## 2017-12-22 PROCEDURE — 96374 THER/PROPH/DIAG INJ IV PUSH: CPT

## 2017-12-22 RX ORDER — FUROSEMIDE 10 MG/ML
20 INJECTION INTRAMUSCULAR; INTRAVENOUS
Status: COMPLETED | OUTPATIENT
Start: 2017-12-22 | End: 2017-12-22

## 2017-12-22 RX ORDER — SODIUM CHLORIDE 9 MG/ML
1000 INJECTION, SOLUTION INTRAVENOUS
Status: COMPLETED | OUTPATIENT
Start: 2017-12-22 | End: 2017-12-22

## 2017-12-22 RX ORDER — DEXTROSE 50 % IN WATER (D50W) INTRAVENOUS SYRINGE
25
Status: DISCONTINUED | OUTPATIENT
Start: 2017-12-22 | End: 2017-12-23

## 2017-12-22 RX ADMIN — FUROSEMIDE 20 MG: 10 INJECTION, SOLUTION INTRAMUSCULAR; INTRAVENOUS at 09:12

## 2017-12-22 RX ADMIN — SODIUM CHLORIDE 1000 ML: 0.9 INJECTION, SOLUTION INTRAVENOUS at 06:12

## 2017-12-22 RX ADMIN — LORAZEPAM 1 MG: 2 INJECTION INTRAMUSCULAR; INTRAVENOUS at 06:12

## 2017-12-22 RX ADMIN — IOHEXOL 75 ML: 350 INJECTION, SOLUTION INTRAVENOUS at 08:12

## 2017-12-22 RX ADMIN — SODIUM CHLORIDE 1000 ML: 0.9 INJECTION, SOLUTION INTRAVENOUS at 09:12

## 2017-12-23 LAB
ALBUMIN SERPL BCP-MCNC: 3 G/DL
ALP SERPL-CCNC: 101 U/L
ALT SERPL W/O P-5'-P-CCNC: 12 U/L
ANION GAP SERPL CALC-SCNC: 15 MMOL/L
AST SERPL-CCNC: 23 U/L
BILIRUB SERPL-MCNC: 1.2 MG/DL
BUN SERPL-MCNC: 19 MG/DL
CALCIUM SERPL-MCNC: 11.7 MG/DL
CHLORIDE SERPL-SCNC: 107 MMOL/L
CO2 SERPL-SCNC: 27 MMOL/L
CREAT SERPL-MCNC: 1 MG/DL
EST. GFR  (AFRICAN AMERICAN): >60 ML/MIN/1.73 M^2
EST. GFR  (NON AFRICAN AMERICAN): >60 ML/MIN/1.73 M^2
FERRITIN SERPL-MCNC: 172 NG/ML
FOLATE SERPL-MCNC: 12.9 NG/ML
GLUCOSE SERPL-MCNC: 74 MG/DL
IRON SERPL-MCNC: 84 UG/DL
LACTATE SERPL-SCNC: 2.4 MMOL/L
POCT GLUCOSE: 82 MG/DL (ref 70–110)
POCT GLUCOSE: 83 MG/DL (ref 70–110)
POTASSIUM SERPL-SCNC: 3.9 MMOL/L
PROT SERPL-MCNC: 11.2 G/DL
PTH-INTACT SERPL-MCNC: 5.2 PG/ML
SATURATED IRON: 27 %
SODIUM SERPL-SCNC: 149 MMOL/L
TOTAL IRON BINDING CAPACITY: 308 UG/DL
TRANSFERRIN SERPL-MCNC: 208 MG/DL
VIT B12 SERPL-MCNC: 189 PG/ML

## 2017-12-23 PROCEDURE — G0378 HOSPITAL OBSERVATION PER HR: HCPCS

## 2017-12-23 PROCEDURE — 82607 VITAMIN B-12: CPT

## 2017-12-23 PROCEDURE — 82746 ASSAY OF FOLIC ACID SERUM: CPT

## 2017-12-23 PROCEDURE — 25000003 PHARM REV CODE 250: Performed by: HOSPITALIST

## 2017-12-23 PROCEDURE — 86580 TB INTRADERMAL TEST: CPT | Performed by: HOSPITALIST

## 2017-12-23 PROCEDURE — 27000221 HC OXYGEN, UP TO 24 HOURS

## 2017-12-23 PROCEDURE — 94761 N-INVAS EAR/PLS OXIMETRY MLT: CPT

## 2017-12-23 PROCEDURE — 63600175 PHARM REV CODE 636 W HCPCS: Performed by: HOSPITALIST

## 2017-12-23 PROCEDURE — S5010 5% DEXTROSE AND 0.45% SALINE: HCPCS | Performed by: HOSPITALIST

## 2017-12-23 PROCEDURE — 80053 COMPREHEN METABOLIC PANEL: CPT

## 2017-12-23 PROCEDURE — 11000001 HC ACUTE MED/SURG PRIVATE ROOM

## 2017-12-23 PROCEDURE — 82652 VIT D 1 25-DIHYDROXY: CPT

## 2017-12-23 PROCEDURE — 82397 CHEMILUMINESCENT ASSAY: CPT

## 2017-12-23 PROCEDURE — 83540 ASSAY OF IRON: CPT

## 2017-12-23 PROCEDURE — 83605 ASSAY OF LACTIC ACID: CPT

## 2017-12-23 PROCEDURE — 83970 ASSAY OF PARATHORMONE: CPT

## 2017-12-23 PROCEDURE — 82728 ASSAY OF FERRITIN: CPT

## 2017-12-23 RX ORDER — DEXTROSE MONOHYDRATE AND SODIUM CHLORIDE 5; .45 G/100ML; G/100ML
INJECTION, SOLUTION INTRAVENOUS CONTINUOUS
Status: DISCONTINUED | OUTPATIENT
Start: 2017-12-23 | End: 2017-12-24

## 2017-12-23 RX ORDER — SODIUM CHLORIDE 9 MG/ML
INJECTION, SOLUTION INTRAVENOUS CONTINUOUS
Status: DISCONTINUED | OUTPATIENT
Start: 2017-12-23 | End: 2017-12-23

## 2017-12-23 RX ORDER — ATENOLOL 50 MG/1
50 TABLET ORAL DAILY
Status: DISCONTINUED | OUTPATIENT
Start: 2017-12-23 | End: 2017-12-24

## 2017-12-23 RX ORDER — AMLODIPINE BESYLATE 5 MG/1
10 TABLET ORAL DAILY
Status: DISCONTINUED | OUTPATIENT
Start: 2017-12-23 | End: 2017-12-24

## 2017-12-23 RX ORDER — MEMANTINE HYDROCHLORIDE 5 MG/1
5 TABLET ORAL DAILY
Status: DISCONTINUED | OUTPATIENT
Start: 2017-12-23 | End: 2017-12-24

## 2017-12-23 RX ORDER — POLYETHYLENE GLYCOL 3350 17 G/17G
17 POWDER, FOR SOLUTION ORAL DAILY
Status: DISCONTINUED | OUTPATIENT
Start: 2017-12-23 | End: 2017-12-26

## 2017-12-23 RX ORDER — VENLAFAXINE HYDROCHLORIDE 37.5 MG/1
37.5 CAPSULE, EXTENDED RELEASE ORAL DAILY
Status: DISCONTINUED | OUTPATIENT
Start: 2017-12-23 | End: 2017-12-24

## 2017-12-23 RX ORDER — GLUCAGON 1 MG
1 KIT INJECTION
Status: DISCONTINUED | OUTPATIENT
Start: 2017-12-23 | End: 2017-12-26

## 2017-12-23 RX ORDER — DEXTROSE MONOHYDRATE AND SODIUM CHLORIDE 5; .9 G/100ML; G/100ML
INJECTION, SOLUTION INTRAVENOUS CONTINUOUS
Status: DISCONTINUED | OUTPATIENT
Start: 2017-12-23 | End: 2017-12-23

## 2017-12-23 RX ORDER — LANOLIN ALCOHOL/MO/W.PET/CERES
1000 CREAM (GRAM) TOPICAL DAILY
Status: DISCONTINUED | OUTPATIENT
Start: 2017-12-23 | End: 2017-12-26

## 2017-12-23 RX ORDER — LATANOPROST 50 UG/ML
1 SOLUTION/ DROPS OPHTHALMIC NIGHTLY
Status: DISCONTINUED | OUTPATIENT
Start: 2017-12-23 | End: 2017-12-27 | Stop reason: HOSPADM

## 2017-12-23 RX ORDER — SODIUM CHLORIDE 0.9 % (FLUSH) 0.9 %
5 SYRINGE (ML) INJECTION
Status: DISCONTINUED | OUTPATIENT
Start: 2017-12-23 | End: 2017-12-27 | Stop reason: HOSPADM

## 2017-12-23 RX ORDER — ENOXAPARIN SODIUM 100 MG/ML
40 INJECTION SUBCUTANEOUS EVERY 24 HOURS
Status: DISCONTINUED | OUTPATIENT
Start: 2017-12-23 | End: 2017-12-26

## 2017-12-23 RX ORDER — LABETALOL HYDROCHLORIDE 5 MG/ML
5 INJECTION, SOLUTION INTRAVENOUS EVERY 6 HOURS PRN
Status: DISCONTINUED | OUTPATIENT
Start: 2017-12-23 | End: 2017-12-26

## 2017-12-23 RX ORDER — CALCITONIN SALMON 200 [USP'U]/ML
4 INJECTION, SOLUTION INTRAMUSCULAR; SUBCUTANEOUS 2 TIMES DAILY
Status: DISCONTINUED | OUTPATIENT
Start: 2017-12-23 | End: 2017-12-23

## 2017-12-23 RX ORDER — IBUPROFEN 200 MG
24 TABLET ORAL
Status: DISCONTINUED | OUTPATIENT
Start: 2017-12-23 | End: 2017-12-24

## 2017-12-23 RX ORDER — TAMSULOSIN HYDROCHLORIDE 0.4 MG/1
1 CAPSULE ORAL DAILY
Status: DISCONTINUED | OUTPATIENT
Start: 2017-12-23 | End: 2017-12-24

## 2017-12-23 RX ORDER — IBUPROFEN 200 MG
16 TABLET ORAL
Status: DISCONTINUED | OUTPATIENT
Start: 2017-12-23 | End: 2017-12-24

## 2017-12-23 RX ORDER — GABAPENTIN 100 MG/1
200 CAPSULE ORAL 3 TIMES DAILY
Status: DISCONTINUED | OUTPATIENT
Start: 2017-12-23 | End: 2017-12-24

## 2017-12-23 RX ORDER — DORZOLAMIDE HYDROCHLORIDE AND TIMOLOL MALEATE 20; 5 MG/ML; MG/ML
1 SOLUTION/ DROPS OPHTHALMIC 2 TIMES DAILY
Status: DISCONTINUED | OUTPATIENT
Start: 2017-12-23 | End: 2017-12-27 | Stop reason: HOSPADM

## 2017-12-23 RX ADMIN — PAMIDRONATE DISODIUM 60 MG: 3 INJECTION, SOLUTION INTRAVENOUS at 11:12

## 2017-12-23 RX ADMIN — DORZOLAMIDE HYDROCHLORIDE AND TIMOLOL MALEATE 1 DROP: 20; 5 SOLUTION/ DROPS OPHTHALMIC at 10:12

## 2017-12-23 RX ADMIN — DORZOLAMIDE HYDROCHLORIDE AND TIMOLOL MALEATE 1 DROP: 20; 5 SOLUTION/ DROPS OPHTHALMIC at 11:12

## 2017-12-23 RX ADMIN — Medication 5 UNITS: at 02:12

## 2017-12-23 RX ADMIN — DEXTROSE AND SODIUM CHLORIDE: 5; .45 INJECTION, SOLUTION INTRAVENOUS at 12:12

## 2017-12-23 RX ADMIN — SODIUM CHLORIDE: 0.9 INJECTION, SOLUTION INTRAVENOUS at 06:12

## 2017-12-23 RX ADMIN — DEXTROSE AND SODIUM CHLORIDE: 5; .45 INJECTION, SOLUTION INTRAVENOUS at 07:12

## 2017-12-23 RX ADMIN — ENOXAPARIN SODIUM 40 MG: 100 INJECTION SUBCUTANEOUS at 05:12

## 2017-12-23 RX ADMIN — LATANOPROST 1 DROP: 50 SOLUTION/ DROPS OPHTHALMIC at 10:12

## 2017-12-23 RX ADMIN — SODIUM CHLORIDE: 0.9 INJECTION, SOLUTION INTRAVENOUS at 01:12

## 2017-12-23 NOTE — NURSING
Dr. Nova notified of patient only responding to painful stimuli and all oral medications being held. Instructed to give medications at a later time if patient becomes alert. Will continue to monitor patient.

## 2017-12-23 NOTE — ED PROVIDER NOTES
Encounter Date: 12/22/2017    SCRIBE #1 NOTE: I, Romel Freeman, am scribing for, and in the presence of, Dr. Alonso.       History     Chief Complaint   Patient presents with    Fatigue     not eating or drinking well and feeling weak x 3 days, history of cancer       The patient is a 82 y.o. male with hx of: cancer that presents to the ED with a complaint of weakness and fatigue for the past week. Patient has stopped eating for the last two days. Per sister, the patient has experienced some abdominal pain, but denies any chest pain, nausea, or vomiting. Patient is nonverbal and does not communicate or respond to verbal commands. History limited secondary to condition of the patient.      The history is provided by a relative. The history is limited by the condition of the patient.     Review of patient's allergies indicates:  No Known Allergies  Past Medical History:   Diagnosis Date    Anemia     Back pain     BPH (benign prostatic hypertrophy)     Chronic constipation     Colon polyp     Diverticulosis     GERD (gastroesophageal reflux disease)     Glaucoma (increased eye pressure)     Hepatitis C     Hypertension     Kidney stone     Myeloma     Urinary tract infection      Past Surgical History:   Procedure Laterality Date    carotid endarterectomy      left    COLON SURGERY       Family History   Problem Relation Age of Onset    Prostate cancer Neg Hx     Kidney disease Neg Hx      Social History   Substance Use Topics    Smoking status: Former Smoker     Packs/day: 0.25     Quit date: 7/19/1982    Smokeless tobacco: Never Used    Alcohol use No     Review of Systems   Constitutional: Positive for appetite change and fatigue.   HENT: Negative for sore throat.    Respiratory: Negative for shortness of breath.    Cardiovascular: Negative for chest pain.   Gastrointestinal: Positive for abdominal pain. Negative for nausea and vomiting.   Genitourinary: Negative for dysuria.   Musculoskeletal:  Negative for back pain.   Skin: Negative for rash.   Neurological: Positive for weakness.   Hematological: Does not bruise/bleed easily.       Physical Exam     Initial Vitals [12/22/17 1732]   BP Pulse Resp Temp SpO2   (!) 167/93 82 16 -- 98 %      MAP       117.67         Physical Exam    Nursing note and vitals reviewed.  Constitutional:   Cachectic, dehydrated   HENT:   Head: Normocephalic and atraumatic.   Eyes: Conjunctivae are normal.   Neck: Neck supple.   Cardiovascular: Normal rate, regular rhythm and normal heart sounds.   Pulmonary/Chest: Breath sounds normal.   Abdominal: Soft. He exhibits no distension. There is tenderness. There is guarding.   Musculoskeletal: Normal range of motion.   Neurological: He is alert and oriented to person, place, and time.   Skin: Skin is dry. No rash noted. No erythema.   Psychiatric: He has a normal mood and affect.         ED Course   Procedures  Labs Reviewed   CBC W/ AUTO DIFFERENTIAL - Abnormal; Notable for the following:        Result Value    RBC 3.38 (*)     Hemoglobin 11.1 (*)     Hematocrit 33.8 (*)      (*)     MCH 32.8 (*)     RDW 15.0 (*)     All other components within normal limits   COMPREHENSIVE METABOLIC PANEL - Abnormal; Notable for the following:     Sodium 149 (*)     Calcium 12.1 (*)     Total Protein 11.5 (*)     Albumin 3.2 (*)     Total Bilirubin 1.1 (*)     All other components within normal limits    Narrative:     Calcium-  critical result(s) verified by repat analysis,  called and   verbal readback obtained from Vida Navarro RN, 12/22/2017 20:29   URINALYSIS - Abnormal; Notable for the following:     Occult Blood UA Trace (*)     Urobilinogen, UA 2.0-3.0 (*)     All other components within normal limits   LACTIC ACID, PLASMA - Abnormal; Notable for the following:     Lactate (Lactic Acid) 2.9 (*)     All other components within normal limits   APTT - Abnormal; Notable for the following:     aPTT 32.4 (*)     All other components within  normal limits   B-TYPE NATRIURETIC PEPTIDE - Abnormal; Notable for the following:      (*)     All other components within normal limits   ALCOHOL,MEDICAL (ETHANOL)   PROTIME-INR   TROPONIN I   LIPASE   AMMONIA   MAGNESIUM   POCT GLUCOSE     EKG Readings: (Independently Interpreted)   Initial Reading: No STEMI. Heart Rate: 94. Ectopy: PVCs. Conduction: Normal. Clinical Impression: with PVCs Other Impression: LVH, Nonspecific ST changes.     Imaging Results          CT Abdomen Pelvis With Contrast / no oral contrast (Final result)  Result time 12/22/17 21:23:58    Final result by Yamil Rivera MD (12/22/17 21:23:58)                 Impression:        1.  Moderate to large amount of colonic and rectal fecal material suggesting constipation with prominent rectal stool ball which could represent mild impaction. No associated bowel obstruction.    2. Urinary bladder circumferential wall thickening without adjacent inflammatory change, which may be related to under distention versus cystitis. Correlate clinically.    3. Otherwise, no acute process seen.    4. Bilateral renal simple cysts and subcentimeter low-attenuation cortical foci which are too small to characterize.    5. Gross stable additional chronic findings as above.      Electronically signed by: YAMIL RIVERA MD, MD  Date:     12/22/17  Time:    21:23              Narrative:    CT of the abdomen and pelvis with 75 cc of Omnipaque 350 IV contrast. No oral contrast. Delayed images were obtained.    Results: Comparison made to chest radiograph same day, renal ultrasound 10/13/17 and CT renal stone study 2/14/15. Respiratory motion and also beam hardening streak artifact somewhat limits evaluation.    The lung bases show mild dependent atelectasis and scattered linear opacities consistent with subsegmental scarring versus atelectasis.  The visualized heart is enlarged without significant pericardial fluid. Coronary arterial and aortic annular  calcifications are noted.    The liver, gallbladder, pancreas, spleen, stomach, duodenum, and adrenal glands are without significant abnormality.  No intrahepatic or extrahepatic biliary ductal dilatation.    The kidneys are grossly stable in size, shape and location concentrating and excreting contrast appropriately. There are several low-attenuation cortical foci at both kidneys, some of which appears simple while others are subcentimeter and too small to characterize.  No hydronephrosis.   The urinary bladder is suboptimally distended with circumferential wall thickening but no adjacent inflammatory change.  Prostate is upper limits of normal in size with coarse calcifications in the central gland, similar to prior. Pelvic phleboliths noted.    No ascites or free air.  No lymphadenopathy.    Left lower quadrant distal colonic bowel anastomosis unchanged. Moderate to large amount of scattered fecal material throughout the colon and rectum, noting prominent stool ball within the rectum without rectal wall thickening or perirectal stranding. The appendix and terminal ileum appear within normal limits.  The small and large loops of bowel are normal in caliber without focal wall thickening or adjacent fat stranding.  No pneumatosis or portal venous gas.    Diffuse advanced calcific atherosclerosis. Abdominal aorta is atherosclerotic and minimally ectatic without aneurysm or dissection.    The osseous structures appear grossly stable without acute process seen.                             CT Head Without Contrast (Final result)  Result time 12/22/17 21:14:45    Final result by Yamil Rivera MD (12/22/17 21:14:45)                 Impression:        1.  No acute large vascular territory infarct or intracranial hemorrhage identified. Further evaluation/followup as warranted.    2.  Senescent changes as above.      Electronically signed by: YAMIL RIVERA MD, MD  Date:     12/22/17  Time:    21:14              Narrative:     COMPARISON: Head CT 9/8/17    FINDINGS: No evidence of hemorrhage, mass, midline shift or acute major vascular territory infarct.  Gray white interface appears intact.  There is age appropriate  generalized cerebral atrophy.  Grossly stable distribution of patchy and confluent low attenuation changes throughout the subcortical and periventricular white matter, nonspecific but can be seen with chronic small vessel ischemic disease.  No new focal areas of abnormal parenchymal attenuation. The ventricles are midline without distortion by mass effect.  No extra-axial hemorrhage or mass. Skull base  vascular calcifications are noted.     The extracranial structures are within normal limits.  Calvarium is intact.  Visualized paranasal sinuses are clear.  Mastoid air cells are clear. Stable right globe abnormality and prior surgical change to the left ocular lens. In                             X-Ray Chest AP Portable (Final result)  Result time 12/22/17 19:44:31    Final result by Yamil Rivera MD (12/22/17 19:44:31)                 Impression:        As above.      Electronically signed by: YAMIL RIVERA MD, MD  Date:     12/22/17  Time:    19:44              Narrative:    COMPARISON: Chest radiograph 9/22/17    FINDINGS: AP portable view of the chest. Patient is rotated. Skin fold projects over the right hemithorax. Left lung apex is partially obscured by overlying chin osseous and soft tissue structures.     Cardiomediastinal silhouette appears stable. Bilateral diffuse chronic interstitial coarsening grossly similar to multiple priors, nonspecific. Otherwise, the lungs are symmetrically normally inflated without large consolidation or new focal opacity. No large pleural effusion or pneumothorax. No acute osseous process seen.   PA and lateral views can be obtained.                                 Medical Decision Making:   History:   Old Medical Records: I decided to obtain old medical records.  Differential  Diagnosis:   Generalized weakness, abdominal pain, failure to thrive, dehydration  Clinical Tests:   Lab Tests: Ordered and Reviewed  ED Management:  10:13 PM - spoke to the resident of Dr. Rodriguez, she will admit patient for further evaluation              Attending Attestation:           Physician Attestation for Hina:      Comments: I, Dr. Alonso, personally performed the services described in this documentation. All medical record entries made by the scribe were at my direction and in my presence.  I have reviewed the chart and agree that the record reflects my personal performance and is accurate and complete.              ED Course      Clinical Impression:   The primary encounter diagnosis was Failure to thrive in adult. Diagnoses of Weakness, Hypercalcemia, Constipation, unspecified constipation type, Dehydration, Acute hypernatremia, Fatigue, unspecified type, Hypercalcemia of malignancy, Anemia of chronic disease, Benign prostatic hyperplasia without lower urinary tract symptoms, Essential hypertension, IgA myeloma, Vascular dementia without behavioral disturbance, and Anemia due to vitamin B12 deficiency, unspecified B12 deficiency type were also pertinent to this visit.    Disposition:   Disposition: Admitted  Condition: Stable                        Kristen Alonso MD  12/23/17 9410

## 2017-12-23 NOTE — ED TRIAGE NOTES
Pt arrived via EMS from home, per pt sister, not acting himself for the last 3 days, little interest in eating and when he does eat has been using his fingers.  Sister reports recent incontinence over the last week, having to use depends now.  Also reports generalized abdominal pain.

## 2017-12-23 NOTE — ED NOTES
Pt resting in bed. No acute distress noted. Respirations even and unlabored. Will continue to monitor.

## 2017-12-23 NOTE — PLAN OF CARE
[facility] Internal Medicine Plan of Care    Spoke with family at bedside (two sisters, Poly and Katlyn - primary care giver) who request Nursing Home Hospice placement for the patient.  Explained goals of care with palliative option and they voiced understanding and would like to proceed with hospice.  Also called daughter (Ledy Funez, in [place]) and she is in agreement with this plan.  Will proceed with treatment of hypercalcemia and poor nutrition until hospice plans confirmed.  Then can transition to comfort care only until placed.  Consult placed to social work to assist with placement near [place].MARINA Saxena, HO-II  [facility] Internal Medicine

## 2017-12-23 NOTE — PLAN OF CARE
Problem: Patient Care Overview  Goal: Plan of Care Review  Outcome: Ongoing (interventions implemented as appropriate)  Plan of care reviewed with patient and family. Continuous IV fluids maintained for hydration. Patient failed OLIVERIO screen and only responsive to painful stimuli. NPO status maintained. Plan of care is for patient to transition to hospice care. Bed locked and low, bed alarm on, side rails up x3, and call light in reach. Tele monitor in placed. No red alarms or ectopy noted during shift. Family verbalizes clear understanding of plan of care. No evidence of understanding from patient.

## 2017-12-23 NOTE — PLAN OF CARE
Problem: Patient Care Overview  Goal: Plan of Care Review  Pt received on nasal cannula at 2  lpm. SPO2  96 %.  Pt in no apparent respiratory distress.  Will continue to monitor.

## 2017-12-23 NOTE — ED NOTES
Pt unccoperative with plan of care, kicking and hitting at staff, attempting to bite staff.  MD notified, order for Ativan obtained.

## 2017-12-23 NOTE — PT/OT/SLP PROGRESS
Speech Language Pathology  Attempted Visit    Mika Valenzuela  MRN: [id number]    Patient not seen today secondary to Patient unwilling to participate. Pt was observed to be unarousable, despite max verbal and tactile-kinesthetic cuing from SLP.  SLP will follow-up next date.      VAISHNAVI Lauren., -SLP  Speech-Language Pathologist   12/23/2017

## 2017-12-23 NOTE — NURSING
PPD placed to left forearm at 1442 on 12/23/17. Due to be read at 1442 on 12/25/17. PPD paper placed in chart.

## 2017-12-23 NOTE — PROGRESS NOTES
"[facility] Resident HO-1 Progress Note    Subjective:      Mika Valenzuela is a 82 y.o. male who is being followed by the [facility] service at [facility] for fatigue, decreased appetite.    No acute events overnight, remains afebrile. Patient received suds enema overnight, per nurse patient had two bowel movements overnight. Patient nonverbal, follows commands with redirection. Remains NPO, unable to talk PO medications at this time. Pending speech evaluation.      Objective:   Last 24 Hour Vital Signs:  BP  Min: 143/69  Max: 168/102  Temp  Av.9 °F (36.6 °C)  Min: 97.3 °F (36.3 °C)  Max: 98.4 °F (36.9 °C)  Pulse  Av.9  Min: 70  Max: 98  Resp  Av.2  Min: 15  Max: 20  SpO2  Av.3 %  Min: 96 %  Max: 100 %  Height  Av' 6" (167.6 cm)  Min: 5' 6" (167.6 cm)  Max: 5' 6" (167.6 cm)  Weight  Av.7 kg (136 lb)  Min: 61.7 kg (136 lb)  Max: 61.7 kg (136 lb)  No intake/output data recorded.    Physical Examination:  General:          Thin, malnourished male, NAD  Head:               Normocephalic and atraumatic  Eyes:               Corneal opacity to right eye.   Mouth:             Poor dentition, moist mucous membranes  Cardio:             Regular rate and rhythm with normal S1 and S2; no murmurs or rubs  Resp:               Not requiring supplemental oxygen, CTAB without wheezing or crackles  Abdom:            Nondistended, BS+, soft, nontender to palpation  Extrem:            No lower extremity edema, 2+ distal pulses  Skin:                No rashes, lesions, or color changes  Pulses:            2+ and symmetric distally  Neuro:             Nonverbal, patient able to follow simple one step commands with redirection. Moves all extremities. Unable to assess strength secondary to inability to participate    Laboratory:  Laboratory Data Reviewed: yes  Pertinent Findings:  pending    Microbiology Data Reviewed: yes  Pertinent Findings:  none    Other " Results:  EKG (my interpretation): nsr    Radiology Data Reviewed: yes  Pertinent Findings:  CXR (12/22): bilateral diffuse chronic interstitial coarsening grossly similar to multiple priors. Otherwise, lungs are normally inflated without large consolidation or new focal opacity. No large pleural effusion or PTX. No acute osseous process seen.     CT head (12/22): no acute large vascular territory infarct or intracranial hemorrhage. Senescent changes    CT abdomen/pelvis (12/22): colonic and rectal fecal material suggesting constipation. No masses    Current Medications:     Infusions:   sodium chloride 0.9%      sodium chloride 0.9% 75 mL/hr at 12/23/17 0159        Scheduled:   amLODIPine  10 mg Oral Daily    atenolol  50 mg Oral Daily    dorzolamide-timolol 2-0.5%  1 drop Both Eyes BID    enoxaparin  40 mg Subcutaneous Daily    gabapentin  200 mg Oral TID    latanoprost  1 drop Both Eyes QHS    memantine  5 mg Oral Daily    tamsulosin  1 capsule Oral Daily    venlafaxine  37.5 mg Oral Daily        PRN:  dextrose 50%, dextrose 50%, glucagon (human recombinant), glucose, glucose, labetalol, sodium chloride 0.9%    Antibiotics and Day Number of Therapy:  none    Lines and Day Number of Therapy:  PIV    Assessment:     Mika Valenzuela is a 82 y.o.male with  Patient Active Problem List    Diagnosis Date Noted    Failure to thrive in adult 12/22/2017    Hypercalcemia of malignancy 12/22/2017    Fecal impaction in rectum 12/22/2017    Vascular dementia without behavioral disturbance 06/30/2017    Peripheral neuropathy 06/01/2015    Compression fracture of L5 lumbar vertebra 01/14/2015    Anemia of chronic disease 12/30/2014    Glaucoma 12/30/2014    HLD (hyperlipidemia) 12/30/2014    BPH (benign prostatic hyperplasia) 12/29/2014    Lumbosacral radiculopathy 12/03/2014    Sacroiliitis 11/03/2014    Chronic low back pain 09/17/2014    IgA myeloma 02/28/2014    Chronic hepatitis C 07/19/2012     HTN (hypertension) 07/19/2012        Plan:     Failure to thrive  - Patient with decreased PO intake over the past three days  - Hypernatremic to 149 with dry mucous membranes on presentation   - Patient with history of multiple myeloma  - CT abdomen/pelvis - no masses noted   - CT head - no masses noted  - Lactic acid elevated at 2.9  - Received 2L IVF in ED --> fluid recuscitation with continuous IV normal saline at 125cc/hr administered overnight with improvement   - Aspiration precuations  - Consult nutrition and speech therapy  - AM labs pending, follow-up AM CMP     Hypercalcemia  -  Calcium elevated at 12.1 on presentation, increased from baseline of 9.5  - Will work-up with calcitrol, PTH, and PTHrP  - AM labs pending, to follow-up     Constipation  - CT abdomen/pelvis - moderate to large amount of colonic and rectal fecal material suggestion constipation with prominent rectal stool ball , no associted obstruction  - Soap suds enema administered --> patient with two bowel movements overnight  - Abdomen soft this morning  - Continue to monitor     IgA kappa ultiple myeloma  - Followed by heme-onc, last seen October 2017  - Treated with Velcade and dexamethasone September 2014-December 2014; second course of treatment from March 2016-February 2017  - Last chemotherapy treatment February 2017   - not a candidate for any further chemotherapy secondary to progressively worsening vascular dementia  - Continue follow-up with heme-onc     Vascular dementia  - Followed by neurology, last seen August 2017  - Progressively worsening, denies history of CVA  - Continue home medications which include Namenda 5mg PO daily and Efefxor XR 37.5mg PO nightly  - CT head with senescent changes  - Neurology follow-up scheduled 2/9/18     HTN:  - BP elevated at 167/93 on presentation  - Home meds include atenolol 50mg PO daily and amlodipine 10mg PO daily  - Continue home medications when patient able to tolerate PO  - PRN  labetalol for SBP >180 if patient unable to swallow medications  - /73 this morning, did not require any doses of PRN labetalol overnight  - Continue to monitor     Peripheral polyneuropathy  - Continue gabapentin 200mg PO TID  - No acute issues     Normocytic anemia  - H/H 11.1/33.8 with , increased from baseline hemoglobin 10.4, likely secondary to hemoconcentration  - Likely secondary to chronic disease  - Work-up with iron, TIBC, ferritin, folate, and B12  - AM labs pending, to follow-up     BPH:  - Continue home medication including tamulosin 0.4mg PO daily  - Patient with urinary incontinence  - No astorga cathter  - Bladder scans as needed     Right eye blindness  - No acute issues    Kell Pfeiffer  \[facility]\"" Internal Medicine HO-1  \[facility]\"" Internal Medicine Service Team A    \[facility]\"" Medicine Hospitalist Pager numbers:   \[facility]\"" Hospitalist Medicine Team A (Nerissa/Michael): [phone]  \[facility]\"" Hospitalist Medicine Team B (Enriqueta/Estiven):  [phone]

## 2017-12-23 NOTE — H&P
U Internal Medicine History and Physical - Resident Note    Admitting Team: Medicine Team A  Attending Physician: Dr. MILDRED Rodriguez  Resident: CARMINA Saxena  Interns: MERE Porter    Date of Admit: 12/22/2017    Chief Complaint     Fatigue (not eating or drinking well and feeling weak x 3 days, history of cancer)   for 3 days    Subjective:      History of Present Illness:  Mika Valenzuela is a 82 y.o. male who  has a past medical history of Anemia; Back pain; BPH (benign prostatic hypertrophy); Chronic constipation; Colon polyp; Diverticulosis; GERD (gastroesophageal reflux disease); Glaucoma (increased eye pressure); Hepatitis C; Hypertension; Kidney stone; Myeloma; and Urinary tract infection.. The patient presented to [facility] on 12/22/2017 with a primary complaint of Fatigue (not eating or drinking well and feeling weak x 3 days, history of cancer)    Patient brought to ED by sister, who is his primary caretaker, for weakness and decreased appetite for the past three days. Patient has a Pmh vascular dementia and multiple myeloma. At baseline, patient is able is able to feed himself, walk with a cane, and go to the bathroom independently. Over the past month, he has progressively become less independent with these tasks. tates the patient has been having more frequent visual hallucinations, has been more agitated and irritible, more incontinent of urine, and more difficult to communicate with. Over the past 3 days, the patient' sister has noticed decreased appetite and weakness.  Patient complains of abdominal pain and constipation. Denies fever, chills, nausea, vomiting, chest pain, shortness of breath, or diarrhea    Sister reports that she can no longer care for patient full time, and is interested in nursing home placement.     Past Medical History:  Past Medical History:   Diagnosis Date    Anemia     Back pain     BPH (benign prostatic hypertrophy)     Chronic constipation      Colon polyp     Diverticulosis     GERD (gastroesophageal reflux disease)     Glaucoma (increased eye pressure)     Hepatitis C     Hypertension     Kidney stone     Myeloma     Urinary tract infection        Past Surgical History:  Past Surgical History:   Procedure Laterality Date    carotid endarterectomy      left    COLON SURGERY         Allergies:  Review of patient's allergies indicates:  No Known Allergies    Home Medications:  Prior to Admission medications    Medication Sig Start Date End Date Taking? Authorizing Provider   acyclovir (ZOVIRAX) 400 MG tablet Take 1 tablet (400 mg total) by mouth once daily. 1/6/17   Curly Ellison MD   amlodipine (NORVASC) 10 MG tablet Take 1 tablet (10 mg total) by mouth once daily. 11/21/16   Lily Valladares MD   atenolol (TENORMIN) 50 MG tablet TAKE 1 TABLET (50 MG TOTAL) BY MOUTH ONCE DAILY. 11/30/17   Winnie Givens MD   bortezomib (VELCADE) 3.5 mg injection Inject 1.3 mg/m2 into the skin as directed. Velcade days 1,4,8, and 11 every 28 days    Historical Provider, MD   brimonidine 0.15 % OPTH DROP (ALPHAGAN) 0.15 % ophthalmic solution Place 1 drop into both eyes 2 (two) times daily. 8/13/15   Historical Provider, MD   dexamethasone (DECADRON) 4 MG Tab Take 20 mg by mouth as directed. Take 20mg of Dexamethasone PO with Velcade injections    Historical Provider, MD   difluprednate (DUREZOL) 0.05 % Drop ophthalmic solution Place 1 drop into both eyes 2 (two) times daily.    Historical Provider, MD   dorzolamide-timolol 2-0.5% (COSOPT) 2-0.5 % ophthalmic solution Place 1 drop into both eyes 2 (two) times daily.  7/5/12   Historical Provider, MD   gabapentin (NEURONTIN) 100 MG capsule TAKE 2 CAPSULES EVERY MORNING, 1 CAP MID DAY, THEN 2 CAPS EVERY EVENING 12/12/17   Winnie Givens MD   latanoprost (XALATAN) 0.005 % ophthalmic solution Place 1 drop into both eyes every evening.     Historical Provider, MD   memantine (NAMENDA) 5 MG Tab Take 1 tablet  "(5 mg total) by mouth once daily. 17  Conrad Grewal MD   tamsulosin (FLOMAX) 0.4 mg Cp24 TAKE 1 CAPSULE EVERY DAY 10/5/17   Ming Archer MD   venlafaxine (EFFEXOR-XR) 37.5 MG 24 hr capsule TAKE 1 CAPSULE (37.5 MG TOTAL) BY MOUTH ONCE DAILY. 9/11/17 11/10/17  Conrad Grewal MD       Family History:  Family History   Problem Relation Age of Onset    Prostate cancer Neg Hx     Kidney disease Neg Hx        Social History:  Social History   Substance Use Topics    Smoking status: Former Smoker     Packs/day: 0.25     Quit date: 1982    Smokeless tobacco: Never Used    Alcohol use No       Review of Systems:  Pertinent positives and negatives are listed in HPI.  All other systems are reviewed and are negative.    Health Maintaince :   Primary Care Physician: Dr. Lily Valladares  Immunizations:   TDap is up to date.  Influenza is not up to date.  Pneumovax is up to date.  Cancer Screening:  Colonoscopy: is up to date. Per chart review, last colonoscopy      Objective:   Last 24 Hour Vital Signs:  BP  Min: 155/108  Max: 168/102  Temp  Av.7 °F (36.5 °C)  Min: 97.3 °F (36.3 °C)  Max: 98 °F (36.7 °C)  Pulse  Av  Min: 82  Max: 95  Resp  Av.7  Min: 16  Max: 18  SpO2  Av.3 %  Min: 98 %  Max: 100 %  Height  Av' 6" (167.6 cm)  Min: 5' 6" (167.6 cm)  Max: 5' 6" (167.6 cm)  Weight  Av.7 kg (136 lb)  Min: 61.7 kg (136 lb)  Max: 61.7 kg (136 lb)  Body mass index is 21.95 kg/m².  No intake/output data recorded.    Physical Examination:  General: Thin, malnourished male, NAD  Head:  Normocephalic and atraumatic  Eyes:  Corneal opacity to right eye.   Mouth:  Poor dentition, dry mucous membranes  Cardio:  Regular rate and rhythm with normal S1 and S2; no murmurs or rubs  Resp:  Not requiring supplemental oxygen, CTAB without wheezing or crackles  Abdom: Nondistended, BS+, Diffuse tenderness to palpation, tense without rebound or guarding  Extrem: No lower extremity " edema, 2+ distal pulses  Skin:  No rashes, lesions, or color changes  Pulses: 2+ and symmetric distally  Neuro:  Patient able to follow simple one step commands with redirection. Moves all extremities. Unable to assess strength secondary to inability to participate    Laboratory:  Most Recent Data:  CBC: Lab Results   Component Value Date    WBC 6.14 12/22/2017    HGB 11.1 (L) 12/22/2017    HCT 33.8 (L) 12/22/2017     12/22/2017     (H) 12/22/2017    RDW 15.0 (H) 12/22/2017     BMP: Lab Results   Component Value Date     (H) 12/22/2017    K 4.4 12/22/2017     12/22/2017    CO2 25 12/22/2017    BUN 22 12/22/2017    CREATININE 1.1 12/22/2017    GLU 85 12/22/2017    CALCIUM 12.1 (HH) 12/22/2017    MG 1.9 12/22/2017    PHOS 3.2 01/27/2017     LFTs: Lab Results   Component Value Date    PROT 11.5 (H) 12/22/2017    ALBUMIN 3.2 (L) 12/22/2017    BILITOT 1.1 (H) 12/22/2017    AST 24 12/22/2017    ALKPHOS 108 12/22/2017    ALT 12 12/22/2017     Coags:   Lab Results   Component Value Date    INR 1.1 12/22/2017     Urinalysis: Lab Results   Component Value Date    LABURIN No growth 10/02/2017    COLORU Yellow 12/22/2017    SPECGRAV 1.010 12/22/2017    NITRITE Negative 12/22/2017    PROTEINUR trace 08/05/2014    KETONESU Negative 12/22/2017    UROBILINOGEN 2.0-3.0 (A) 12/22/2017    BILIRUBINUR normal 08/05/2014    WBCUA >100 (H) 09/22/2017       Trended Lab Data:    Recent Labs  Lab 12/22/17  1846   WBC 6.14   HGB 11.1*   HCT 33.8*      *   RDW 15.0*   *   K 4.4      CO2 25   BUN 22   CREATININE 1.1   GLU 85   PROT 11.5*   ALBUMIN 3.2*   BILITOT 1.1*   AST 24   ALKPHOS 108   ALT 12       Trended Cardiac Data:    Recent Labs  Lab 12/22/17  1846   TROPONINI 0.012   *       Microbiology Data:  none    Other Laboratory Data:  none    Other Results:  EKG (my interpretation): nsr    Radiology:  Imaging Results          CT Abdomen Pelvis With Contrast / no oral contrast (Final  result)  Result time 12/22/17 21:23:58    Final result by Yamil Rivera MD (12/22/17 21:23:58)                 Impression:        1.  Moderate to large amount of colonic and rectal fecal material suggesting constipation with prominent rectal stool ball which could represent mild impaction. No associated bowel obstruction.    2. Urinary bladder circumferential wall thickening without adjacent inflammatory change, which may be related to under distention versus cystitis. Correlate clinically.    3. Otherwise, no acute process seen.    4. Bilateral renal simple cysts and subcentimeter low-attenuation cortical foci which are too small to characterize.    5. Gross stable additional chronic findings as above.      Electronically signed by: YAMIL RIVERA MD, MD  Date:     12/22/17  Time:    21:23              Narrative:    CT of the abdomen and pelvis with 75 cc of Omnipaque 350 IV contrast. No oral contrast. Delayed images were obtained.    Results: Comparison made to chest radiograph same day, renal ultrasound 10/13/17 and CT renal stone study 2/14/15. Respiratory motion and also beam hardening streak artifact somewhat limits evaluation.    The lung bases show mild dependent atelectasis and scattered linear opacities consistent with subsegmental scarring versus atelectasis.  The visualized heart is enlarged without significant pericardial fluid. Coronary arterial and aortic annular calcifications are noted.    The liver, gallbladder, pancreas, spleen, stomach, duodenum, and adrenal glands are without significant abnormality.  No intrahepatic or extrahepatic biliary ductal dilatation.    The kidneys are grossly stable in size, shape and location concentrating and excreting contrast appropriately. There are several low-attenuation cortical foci at both kidneys, some of which appears simple while others are subcentimeter and too small to characterize.  No hydronephrosis.   The urinary bladder is suboptimally distended with  circumferential wall thickening but no adjacent inflammatory change.  Prostate is upper limits of normal in size with coarse calcifications in the central gland, similar to prior. Pelvic phleboliths noted.    No ascites or free air.  No lymphadenopathy.    Left lower quadrant distal colonic bowel anastomosis unchanged. Moderate to large amount of scattered fecal material throughout the colon and rectum, noting prominent stool ball within the rectum without rectal wall thickening or perirectal stranding. The appendix and terminal ileum appear within normal limits.  The small and large loops of bowel are normal in caliber without focal wall thickening or adjacent fat stranding.  No pneumatosis or portal venous gas.    Diffuse advanced calcific atherosclerosis. Abdominal aorta is atherosclerotic and minimally ectatic without aneurysm or dissection.    The osseous structures appear grossly stable without acute process seen.                             CT Head Without Contrast (Final result)  Result time 12/22/17 21:14:45    Final result by Yamil Rivera MD (12/22/17 21:14:45)                 Impression:        1.  No acute large vascular territory infarct or intracranial hemorrhage identified. Further evaluation/followup as warranted.    2.  Senescent changes as above.      Electronically signed by: YAMIL RIVERA MD, MD  Date:     12/22/17  Time:    21:14              Narrative:    COMPARISON: Head CT 9/8/17    FINDINGS: No evidence of hemorrhage, mass, midline shift or acute major vascular territory infarct.  Gray white interface appears intact.  There is age appropriate  generalized cerebral atrophy.  Grossly stable distribution of patchy and confluent low attenuation changes throughout the subcortical and periventricular white matter, nonspecific but can be seen with chronic small vessel ischemic disease.  No new focal areas of abnormal parenchymal attenuation. The ventricles are midline without distortion by mass  effect.  No extra-axial hemorrhage or mass. Skull base  vascular calcifications are noted.     The extracranial structures are within normal limits.  Calvarium is intact.  Visualized paranasal sinuses are clear.  Mastoid air cells are clear. Stable right globe abnormality and prior surgical change to the left ocular lens. In                             X-Ray Chest AP Portable (Final result)  Result time 12/22/17 19:44:31    Final result by Yamil Rivera MD (12/22/17 19:44:31)                 Impression:        As above.      Electronically signed by: YAMIL RIVERA MD, MD  Date:     12/22/17  Time:    19:44              Narrative:    COMPARISON: Chest radiograph 9/22/17    FINDINGS: AP portable view of the chest. Patient is rotated. Skin fold projects over the right hemithorax. Left lung apex is partially obscured by overlying chin osseous and soft tissue structures.     Cardiomediastinal silhouette appears stable. Bilateral diffuse chronic interstitial coarsening grossly similar to multiple priors, nonspecific. Otherwise, the lungs are symmetrically normally inflated without large consolidation or new focal opacity. No large pleural effusion or pneumothorax. No acute osseous process seen.   PA and lateral views can be obtained.                                 Assessment:     Mika Valenzuela is a 82 y.o. male with:     Plan:     Failure to thrive  - Patient with decreased PO intake over the past three days  - Hypernatremic to 149 on presentation, dry mucous membranes  - Patient with history of multiple myeloma  - CT abdomen/pelvis - no masses noted   - CT head - no masses noted  - Lactic acid elevated at 2.9  - Received 2L IVF in ED --> continue fluid recuscitation with continuous IV normal saline at 125cc/hr  - Aspiration precuations  - Consult nutrition and speech therapy  - Follow-up CMP in the AM    Hypercalcemia  -  Calcium elevated at 12.1 on presentation, increased from baseline of 9.5  - Will work-up with  calcitrol, PTH, and PTHrP    Constipation  - CT abdomen/pelvis - moderate to large amount of colonic and rectal fecal material suggestion constipation with prominent rectal stool ball , no associted obstruction  - Soap suds enema  - Continue to monitor    IgA kappa ultiple myeloma  - Followed by heme-onc, last seen October 2017  - Treated with Velcade and dexamethasone September 2014-December 2014; second course of treatment from March 2016-February 2017  - Last chemotherapy treatment February 2017   - not a candidate for any further chemotherapy secondary to progressively worsening vascular dementia  - Continue follow-up with heme-onc    Vascular dementia  - Followed by neurology, last seen August 2017  - Progressively worsening, denies history of CVA  - Continue home medications which include Namenda 5mg PO daily and Efefxor XR 37.5mg PO nightly  - CT head with senescent changes  - Neurology follow-up scheduled 2/9/18    HTN:  - BP elevated at 167/93 on presentation  - Home meds include atenolol 50mg PO daily and amlodipine 10mg PO daily  - Continue home medications  - PRN labetalol for SBP >180 if patient unable to swallow medications  - Continue to monitor    Peripheral polyneuropathy  - Continue gabapentin 200mg PO TID  - No acute issues    Normocytic anemia  - H/H 11.1/33.8 with , increased from baseline hemoglobin 10.4, likely secondary to hemoconcentration  - Likely secondary to chronic disease  - Work-up with iron, TIBC, ferritin, folate, and B12    BPH:  - Continue home medication including tamulosin 0.4mg PO daily  - Patient with urinary incontinence  - No astorga cathter  - Bladder scans as needed    Right eye blindness  - No acute issues    Code Status:     DNR    Kell Pfeiffer  [facility] Internal Medicine HO-I  U Medicine Service    [facility] Medicine Hospitalist Pager numbers:   [facility] Hospitalist Medicine Team A (Nerissa/Michael): [phone]  [facility] Hospitalist Medicine Team B (Enriqueta/Estiven):   [phone]

## 2017-12-23 NOTE — NURSING
Notified Dr. Saxena of inability to perform complete neuro checks on patient due to his inability to follow commands. No new orders given at this time

## 2017-12-24 LAB
ALBUMIN SERPL BCP-MCNC: 2.7 G/DL
ALP SERPL-CCNC: 91 U/L
ALT SERPL W/O P-5'-P-CCNC: 11 U/L
ANION GAP SERPL CALC-SCNC: 14 MMOL/L
AST SERPL-CCNC: 25 U/L
BILIRUB SERPL-MCNC: 1.8 MG/DL
BUN SERPL-MCNC: 14 MG/DL
CALCIUM SERPL-MCNC: 10.8 MG/DL
CHLORIDE SERPL-SCNC: 105 MMOL/L
CO2 SERPL-SCNC: 26 MMOL/L
CREAT SERPL-MCNC: 1 MG/DL
EST. GFR  (AFRICAN AMERICAN): >60 ML/MIN/1.73 M^2
EST. GFR  (NON AFRICAN AMERICAN): >60 ML/MIN/1.73 M^2
GLUCOSE SERPL-MCNC: 98 MG/DL
LACTATE SERPL-SCNC: 2.4 MMOL/L
LACTATE SERPL-SCNC: 2.8 MMOL/L
POTASSIUM SERPL-SCNC: 3.1 MMOL/L
PROT SERPL-MCNC: 10.6 G/DL
SODIUM SERPL-SCNC: 145 MMOL/L

## 2017-12-24 PROCEDURE — 93005 ELECTROCARDIOGRAM TRACING: CPT

## 2017-12-24 PROCEDURE — 63600175 PHARM REV CODE 636 W HCPCS: Performed by: STUDENT IN AN ORGANIZED HEALTH CARE EDUCATION/TRAINING PROGRAM

## 2017-12-24 PROCEDURE — S5010 5% DEXTROSE AND 0.45% SALINE: HCPCS | Performed by: HOSPITALIST

## 2017-12-24 PROCEDURE — 63600175 PHARM REV CODE 636 W HCPCS: Performed by: HOSPITALIST

## 2017-12-24 PROCEDURE — 11000001 HC ACUTE MED/SURG PRIVATE ROOM

## 2017-12-24 PROCEDURE — 27000221 HC OXYGEN, UP TO 24 HOURS

## 2017-12-24 PROCEDURE — 25000003 PHARM REV CODE 250: Performed by: INTERNAL MEDICINE

## 2017-12-24 PROCEDURE — 25000003 PHARM REV CODE 250: Performed by: STUDENT IN AN ORGANIZED HEALTH CARE EDUCATION/TRAINING PROGRAM

## 2017-12-24 PROCEDURE — 94761 N-INVAS EAR/PLS OXIMETRY MLT: CPT

## 2017-12-24 PROCEDURE — 83605 ASSAY OF LACTIC ACID: CPT | Mod: 91

## 2017-12-24 PROCEDURE — 87040 BLOOD CULTURE FOR BACTERIA: CPT | Mod: 59

## 2017-12-24 PROCEDURE — 63600175 PHARM REV CODE 636 W HCPCS: Performed by: INTERNAL MEDICINE

## 2017-12-24 PROCEDURE — 36415 COLL VENOUS BLD VENIPUNCTURE: CPT

## 2017-12-24 PROCEDURE — 25000003 PHARM REV CODE 250: Performed by: HOSPITALIST

## 2017-12-24 PROCEDURE — 80053 COMPREHEN METABOLIC PANEL: CPT

## 2017-12-24 RX ORDER — SODIUM CHLORIDE 9 MG/ML
INJECTION, SOLUTION INTRAVENOUS CONTINUOUS
Status: DISCONTINUED | OUTPATIENT
Start: 2017-12-24 | End: 2017-12-24

## 2017-12-24 RX ORDER — POTASSIUM CHLORIDE 7.45 MG/ML
10 INJECTION INTRAVENOUS
Status: COMPLETED | OUTPATIENT
Start: 2017-12-24 | End: 2017-12-24

## 2017-12-24 RX ORDER — ACETAMINOPHEN 650 MG/1
650 SUPPOSITORY RECTAL EVERY 6 HOURS PRN
Status: DISCONTINUED | OUTPATIENT
Start: 2017-12-24 | End: 2017-12-27 | Stop reason: HOSPADM

## 2017-12-24 RX ADMIN — LATANOPROST 1 DROP: 50 SOLUTION/ DROPS OPHTHALMIC at 08:12

## 2017-12-24 RX ADMIN — PIPERACILLIN SODIUM AND TAZOBACTAM SODIUM 4.5 G: 4; .5 INJECTION, POWDER, FOR SOLUTION INTRAVENOUS at 07:12

## 2017-12-24 RX ADMIN — SODIUM CHLORIDE: 0.9 INJECTION, SOLUTION INTRAVENOUS at 12:12

## 2017-12-24 RX ADMIN — POTASSIUM CHLORIDE 500 ML/HR: 2 INJECTION, SOLUTION, CONCENTRATE INTRAVENOUS at 03:12

## 2017-12-24 RX ADMIN — POTASSIUM CHLORIDE 10 MEQ: 10 INJECTION, SOLUTION INTRAVENOUS at 06:12

## 2017-12-24 RX ADMIN — DORZOLAMIDE HYDROCHLORIDE AND TIMOLOL MALEATE 1 DROP: 20; 5 SOLUTION/ DROPS OPHTHALMIC at 08:12

## 2017-12-24 RX ADMIN — PIPERACILLIN SODIUM AND TAZOBACTAM SODIUM 4.5 G: 4; .5 INJECTION, POWDER, FOR SOLUTION INTRAVENOUS at 03:12

## 2017-12-24 RX ADMIN — PIPERACILLIN SODIUM AND TAZOBACTAM SODIUM 4.5 G: 4; .5 INJECTION, POWDER, FOR SOLUTION INTRAVENOUS at 10:12

## 2017-12-24 RX ADMIN — VANCOMYCIN HYDROCHLORIDE 1250 MG: 100 INJECTION, POWDER, LYOPHILIZED, FOR SOLUTION INTRAVENOUS at 09:12

## 2017-12-24 RX ADMIN — DORZOLAMIDE HYDROCHLORIDE AND TIMOLOL MALEATE 1 DROP: 20; 5 SOLUTION/ DROPS OPHTHALMIC at 09:12

## 2017-12-24 RX ADMIN — DEXTROSE AND SODIUM CHLORIDE: 5; .45 INJECTION, SOLUTION INTRAVENOUS at 03:12

## 2017-12-24 RX ADMIN — SODIUM CHLORIDE 1000 ML: 0.9 INJECTION, SOLUTION INTRAVENOUS at 09:12

## 2017-12-24 RX ADMIN — POTASSIUM CHLORIDE 10 MEQ: 10 INJECTION, SOLUTION INTRAVENOUS at 05:12

## 2017-12-24 RX ADMIN — ENOXAPARIN SODIUM 40 MG: 100 INJECTION SUBCUTANEOUS at 05:12

## 2017-12-24 RX ADMIN — POTASSIUM CHLORIDE 10 MEQ: 10 INJECTION, SOLUTION INTRAVENOUS at 03:12

## 2017-12-24 RX ADMIN — ACETAMINOPHEN 650 MG: 650 SUPPOSITORY RECTAL at 07:12

## 2017-12-24 RX ADMIN — POTASSIUM CHLORIDE 10 MEQ: 10 INJECTION, SOLUTION INTRAVENOUS at 08:12

## 2017-12-24 RX ADMIN — POTASSIUM CHLORIDE 10 MEQ: 10 INJECTION, SOLUTION INTRAVENOUS at 07:12

## 2017-12-24 NOTE — PROGRESS NOTES
[facility] IM Resident YAN Progress Note    Subjective:      Overnight, patient spiked a fever up to 103. Blood/urine cultures ordered, Patient started on Vanc and Zosyn. Patient less responsive this morning than yesterday. No family at bedside. Patient would not awaken to sternal rub or voice command. Withdraws from pain in all 4 quadrants. Otherwise, unresponsive.      Objective:   Last 24 Hour Vital Signs:  BP  Min: 128/58  Max: 187/86  Temp  Av.5 °F (37.5 °C)  Min: 97.8 °F (36.6 °C)  Max: 103 °F (39.4 °C)  Pulse  Av.4  Min: 20  Max: 110  Resp  Av.4  Min: 18  Max: 20  SpO2  Av.7 %  Min: 94 %  Max: 100 %  I/O last 3 completed shifts:  In: 2000 [I.V.:1000; IV Piggyback:1000]  Out: 1178 [Urine:1178]    Physical Examination:  General: Thin, malnourished male, NAD, does not awaken to to sternal rub or voice command.   Head: Normocephalic and atraumatic  Eyes:  Corneal opacity to right eye. Conjunctivitis noted bilaterally patient closes eyes shut when light shined.  Mouth:  Poor dentition, moist mucous membranes  Cardio:tachycardia, normal rhythm with normal S1 and S2; no murmurs or rubs  Resp: Not requiring supplemental oxygen, CTAB without wheezing or crackles  Abdom:  Nondistended, BS+, soft, nontender to palpation  Extrem: No lower extremity edema, 2+ distal pulses  Skin: No rashes, lesions, or color changes  Pulses: 2+ and symmetric distally  Neuro: Nonverbal, patient withdraws to pain, does not awaken to sternal rub, Unable to assess strength secondary to inability to participate.    Laboratory:  Laboratory Data Reviewed:   Pertinent Findings:  Recent Labs      17   WBC  6.14   HGB  11.1*   HCT  33.8*   PLT  170   MCV  100*   RDW  15.0*   GRAN  59.4  3.7   LYMPH  28.8  1.8   MONO  11.4  0.7   EOS  0.0   BASO  0.01   EOSINOPHIL  0.2   BASOPHIL  0.2       Recent Labs      176  17   0516   NA  149*  149*   K  4.4  3.9   CL  108  107   CO2  25  27   BUN  22  19    CREATININE  1.1  1.0   GLU  85  74     Recent Labs      12/22/17   1857  12/22/17   2306  12/23/17   1035   POCTGLUCOSE  97  82  83     Recent Labs      12/22/17   1846  12/23/17   0516   PROT  11.5*  11.2*   ALBUMIN  3.2*  3.0*   BILITOT  1.1*  1.2*   AST  24  23   ALT  12  12   ALKPHOS  108  101         Microbiology Data Reviewed:   Pertinent Findings:  Microbiology Results (last 7 days)     Procedure Component Value Units Date/Time    Urine culture [[id number]]     Order Status:  No result Specimen:  Urine from Urine, Catheterized     Blood culture [[id number]]     Order Status:  Sent Specimen:  Blood     Blood culture [[id number]]     Order Status:  Sent Specimen:  Blood             Other Results:  EKG (my interpretation):   Sinus tachycardia, q waves in anterior leads. Significant baseline artifact. Axis  WNL. Intervals WNL.     Radiology Data Reviewed:   Pertinent Findings:  Imaging Results          CT Abdomen Pelvis With Contrast / no oral contrast (Final result)  Result time 12/22/17 21:23:58    Final result by Yamil Rivera MD (12/22/17 21:23:58)                 Impression:        1.  Moderate to large amount of colonic and rectal fecal material suggesting constipation with prominent rectal stool ball which could represent mild impaction. No associated bowel obstruction.    2. Urinary bladder circumferential wall thickening without adjacent inflammatory change, which may be related to under distention versus cystitis. Correlate clinically.    3. Otherwise, no acute process seen.    4. Bilateral renal simple cysts and subcentimeter low-attenuation cortical foci which are too small to characterize.    5. Gross stable additional chronic findings as above.      Electronically signed by: YAMIL RIVERA MD, MD  Date:     12/22/17  Time:    21:23              Narrative:    CT of the abdomen and pelvis with 75 cc of Omnipaque 350 IV contrast. No oral contrast. Delayed images were obtained.    Results: Comparison  made to chest radiograph same day, renal ultrasound 10/13/17 and CT renal stone study 2/14/15. Respiratory motion and also beam hardening streak artifact somewhat limits evaluation.    The lung bases show mild dependent atelectasis and scattered linear opacities consistent with subsegmental scarring versus atelectasis.  The visualized heart is enlarged without significant pericardial fluid. Coronary arterial and aortic annular calcifications are noted.    The liver, gallbladder, pancreas, spleen, stomach, duodenum, and adrenal glands are without significant abnormality.  No intrahepatic or extrahepatic biliary ductal dilatation.    The kidneys are grossly stable in size, shape and location concentrating and excreting contrast appropriately. There are several low-attenuation cortical foci at both kidneys, some of which appears simple while others are subcentimeter and too small to characterize.  No hydronephrosis.   The urinary bladder is suboptimally distended with circumferential wall thickening but no adjacent inflammatory change.  Prostate is upper limits of normal in size with coarse calcifications in the central gland, similar to prior. Pelvic phleboliths noted.    No ascites or free air.  No lymphadenopathy.    Left lower quadrant distal colonic bowel anastomosis unchanged. Moderate to large amount of scattered fecal material throughout the colon and rectum, noting prominent stool ball within the rectum without rectal wall thickening or perirectal stranding. The appendix and terminal ileum appear within normal limits.  The small and large loops of bowel are normal in caliber without focal wall thickening or adjacent fat stranding.  No pneumatosis or portal venous gas.    Diffuse advanced calcific atherosclerosis. Abdominal aorta is atherosclerotic and minimally ectatic without aneurysm or dissection.    The osseous structures appear grossly stable without acute process seen.                             CT Head  Without Contrast (Final result)  Result time 12/22/17 21:14:45    Final result by Yamil Rivera MD (12/22/17 21:14:45)                 Impression:        1.  No acute large vascular territory infarct or intracranial hemorrhage identified. Further evaluation/followup as warranted.    2.  Senescent changes as above.      Electronically signed by: YAMIL RIVERA MD, MD  Date:     12/22/17  Time:    21:14              Narrative:    COMPARISON: Head CT 9/8/17    FINDINGS: No evidence of hemorrhage, mass, midline shift or acute major vascular territory infarct.  Gray white interface appears intact.  There is age appropriate  generalized cerebral atrophy.  Grossly stable distribution of patchy and confluent low attenuation changes throughout the subcortical and periventricular white matter, nonspecific but can be seen with chronic small vessel ischemic disease.  No new focal areas of abnormal parenchymal attenuation. The ventricles are midline without distortion by mass effect.  No extra-axial hemorrhage or mass. Skull base  vascular calcifications are noted.     The extracranial structures are within normal limits.  Calvarium is intact.  Visualized paranasal sinuses are clear.  Mastoid air cells are clear. Stable right globe abnormality and prior surgical change to the left ocular lens. In                             X-Ray Chest AP Portable (Final result)  Result time 12/22/17 19:44:31    Final result by Yamil Rivera MD (12/22/17 19:44:31)                 Impression:        As above.      Electronically signed by: YAMIL RIVERA MD, MD  Date:     12/22/17  Time:    19:44              Narrative:    COMPARISON: Chest radiograph 9/22/17    FINDINGS: AP portable view of the chest. Patient is rotated. Skin fold projects over the right hemithorax. Left lung apex is partially obscured by overlying chin osseous and soft tissue structures.     Cardiomediastinal silhouette appears stable. Bilateral diffuse chronic interstitial  coarsening grossly similar to multiple priors, nonspecific. Otherwise, the lungs are symmetrically normally inflated without large consolidation or new focal opacity. No large pleural effusion or pneumothorax. No acute osseous process seen.   PA and lateral views can be obtained.                                Current Medications:     Infusions:       Scheduled:   amLODIPine  10 mg Oral Daily    atenolol  50 mg Oral Daily    cyanocobalamin  1,000 mcg Oral Daily    dorzolamide-timolol 2-0.5%  1 drop Both Eyes BID    enoxaparin  40 mg Subcutaneous Daily    gabapentin  200 mg Oral TID    latanoprost  1 drop Both Eyes QHS    memantine  5 mg Oral Daily    piperacillin-tazobactam 4.5 g in sodium chloride 0.9% 100 mL IVPB (ready to mix system)  4.5 g Intravenous Q8H    polyethylene glycol  17 g Oral Daily    sodium chloride 0.9%  1,000 mL Intravenous Once    tamsulosin  1 capsule Oral Daily    vancomycin (VANCOCIN) IVPB  1,250 mg Intravenous Once    [START ON 12/25/2017] vancomycin (VANCOCIN) IVPB  1,000 mg Intravenous Q24H    venlafaxine  37.5 mg Oral Daily        PRN:  acetaminophen, dextrose 50%, dextrose 50%, glucagon (human recombinant), glucose, glucose, labetalol, sodium chloride 0.9%    Antibiotics and Day Number of Therapy:  Vanc and Zosyn day 1    Lines and Day Number of Therapy:  Right arm PIV    Assessment:     Mika Valenzuela is a 82 y.o.male with  Patient Active Problem List    Diagnosis Date Noted    Acute hypernatremia     Failure to thrive in adult 12/22/2017    Hypercalcemia of malignancy 12/22/2017    Fecal impaction in rectum 12/22/2017    Vascular dementia without behavioral disturbance 06/30/2017    Peripheral neuropathy 06/01/2015    Compression fracture of L5 lumbar vertebra 01/14/2015    Anemia of chronic disease 12/30/2014    Glaucoma 12/30/2014    HLD (hyperlipidemia) 12/30/2014    Severe sepsis 12/29/2014    BPH (benign prostatic hyperplasia) 12/29/2014    Lumbosacral  radiculopathy 12/03/2014    Sacroiliitis 11/03/2014    Chronic low back pain 09/17/2014    IgA myeloma 02/28/2014    Constipation 09/18/2013    B12 deficiency anemia 08/26/2013    Chronic hepatitis C 07/19/2012    HTN (hypertension) 07/19/2012        Plan:     Severe Sepsis   -Patient spiked fever to 103 this morning.   -Started on Vancomycin and Zosyn.  -Fever, HR >90, Lactic acidosis  -Started on Vanc and Zosyn   -Blood cultures x 2  -UA, Urine Culture  -1L bolus x 1   -CXR      Failure to thrive  - Patient with decreased PO intake over the past three days  - Hypernatremic to 149 with dry mucous membranes on presentation   - Patient with history of multiple myeloma  - CT abdomen/pelvis - no masses noted   - CT head - no masses noted  - Lactic acid elevated at 2.9  - Received 2L IVF in ED --> fluid recuscitation with continuous IV normal saline at 125cc/hr administered overnight with improvement   - Aspiration precuations  - Consult nutrition and speech therapy. Patient unable to participate in swallow study. Nutrition Signing off.   - AM labs pending, follow-up AM CMP     Hypercalcemia  -  Calcium elevated at 12.1 on presentation, increased from baseline of 9.5  - Will work-up with calcitrol (pending) , PTH decreased at 5.2, and PTHrP (pending)  -Received 1 dose of pamidronate on 12/23/2017  - AM labs pending, to follow-up     Constipation  - CT abdomen/pelvis - moderate to large amount of colonic and rectal fecal material suggestion constipation with prominent rectal stool ball , no associted obstruction  - Soap suds enema administered --> patient with two bowel movements overnight on 12/23/2017  - Abdomen soft this morning  - Continue to monitor     IgA kappa ultiple myeloma  - Followed by heme-onc, last seen October 2017  - Treated with Velcade and dexamethasone September 2014-December 2014; second course of treatment from March 2016-February 2017  - Last chemotherapy treatment February 2017   - not a  candidate for any further chemotherapy secondary to progressively worsening vascular dementia  - Continue follow-up with heme-onc     Vascular dementia  - Followed by neurology, last seen August 2017  - Progressively worsening, denies history of CVA  - Continue home medications which include Namenda 5mg PO daily and Efefxor XR 37.5mg PO nightly. Discontinued 2/2 to patient being unable to participate in/ pass a swallow study.   - CT head with senescent changes  - Neurology follow-up scheduled 2/9/18      HTN:  - BP elevated at 167/93 on presentation  - Home meds include atenolol 50mg PO daily and amlodipine 10mg PO daily - Holding as patient has not been able to participate in a swallow study.   - Continue home medications when patient able to tolerate PO  - PRN labetalol for SBP >180 if patient unable to swallow medications  - /95 this morning  - Continue to monitor in setting of sepsis- PRN labetalol order discontinued.      Peripheral polyneuropathy  - Continue gabapentin 200mg PO TID  - No acute issues     Normocytic anemia  - H/H 11.1/33.8 with , increased from baseline hemoglobin 10.4, likely secondary to hemoconcentration  - Likely secondary to chronic disease  - Work-up with iron, TIBC, ferritin, folate, and B12  - Low B12 - Cyanocobalamin tablet 1,000 mcg daily     BPH:  - Continue home medication including tamulosin 0.4mg PO daily  - Patient with urinary incontinence  - No astorga catheter  - Bladder scans as needed     Right eye blindness  - No acute issues    Disposition: Transition to hospice care.    Fitz Porter  [facility] Internal Medicine HO-I  U IM Service Team A    [facility] Medicine Hospitalist Pager numbers:   [facility] Hospitalist Medicine Team A (Nerissa/Michael): [phone]  [facility] Hospitalist Medicine Team B (Enriqueta/Estiven):  [phone]

## 2017-12-24 NOTE — PLAN OF CARE
12/23/17 1700   Discharge Assessment   Assessment Type Discharge Planning Assessment   Confirmed/corrected address and phone number on facesheet? Yes   Assessment information obtained from? Caregiver  (Donna Valenzuela(sister)[phone])   Prior to hospitilization cognitive status: Not Oriented to Person;Not Oriented to Place;Not Oriented to Time   Prior to hospitalization functional status: Partially Dependent   Current cognitive status: Not Oriented to Person;Not Oriented to Place;Not Oriented to Time   Current Functional Status: Partially Dependent   Facility Arrived From: home   Lives With sibling(s)   Able to Return to Prior Arrangements no   Is patient able to care for self after discharge? No   Who are your caregiver(s) and their phone number(s)? Donna Valenzuela(sister)[phone]/Poly Cesar(sister)[phone]   Patient's perception of discharge disposition nursing home   Readmission Within The Last 30 Days no previous admission in last 30 days   Patient currently being followed by outpatient case management? No   Patient currently receives any other outside agency services? No   Equipment Currently Used at Home none   Do you have any problems affording any of your prescribed medications? No   Is the patient taking medications as prescribed? yes   Does the patient have transportation home? Yes   Transportation Available family or friend will provide   Discharge Plan A New Nursing Home placement - [facility] care facility   Discharge Plan B Inpatient Hospice   Patient/Family In Agreement With Plan yes   The Sw spoke to the pt's sister Donna via phone to complete the assessment due to the pt having very bad dementia. She states the pt doesn't use dme at home. She states they want nhp with hospice for the pt b/c they can't care for him at home anymore. She states her sister Poly transports the pt to all his dr's appointments. The pt has 2 dtr's both who live out of state but agree with nhp for the pt. The family  wants the pt placed near them at [facility] but are agreeable to [facility] and [facility] for placement.

## 2017-12-24 NOTE — PROGRESS NOTES
The Sw spoke to the pt's sister Donna Valenzuela([phone])who states they want nhp for the pt with hospice. They want the pt placed near them at [facility] or [facility]. They are agreeable to [facility] or [facility] if none is found near them. They were agreeable to [facility] also. The Sw faxed the pt's info to various nh's via [facility]. The family states they just can't care for the pt any longer.

## 2017-12-24 NOTE — CONSULTS
Consult received 2/2 pt with failure to thrive & multiple myeloma. Noted ST recs NPO. Family decided on hospice. Reconsult if needed.

## 2017-12-24 NOTE — PROGRESS NOTES
"Vancomycin Dosing and Monitoring Pharmacy Protocol    Mika Valenzuela is a 82 y.o. male    Height: 5' 6" (1.676 m)   Wt Readings from Last 1 Encounters:   12/22/17 61.7 kg (136 lb)     Ideal body weight: 63.8 kg (140 lb 10.5 oz)    Temp Readings from Last 3 Encounters:   12/24/17 (!) 102.2 °F (39 °C) (Axillary)   10/13/17 97.2 °F (36.2 °C) (Oral)   09/25/17 98.6 °F (37 °C) (Oral)      Lab Results   Component Value Date/Time    WBC 6.14 12/22/2017 06:46 PM    WBC 5.71 09/25/2017 04:11 AM    WBC 8.73 09/24/2017 06:24 AM      Lab Results   Component Value Date/Time    CREATININE 1.0 12/23/2017 05:16 AM    CREATININE 1.1 12/22/2017 06:46 PM    CREATININE 0.8 09/25/2017 04:11 AM        Serum creatinine: 1 mg/dL 12/23/17 0516  Estimated creatinine clearance: 49.7 mL/min    Antibiotics       Start     Stop Route Frequency Ordered    12/24/17 0630  piperacillin-tazobactam 4.5 g in sodium chloride 0.9% 100 mL IVPB (ready to mix system)      -- IV Every 8 hours (non-standard times) 12/24/17 0507    12/24/17 0615  vancomycin 1 g in dextrose 5 % 250 mL IVPB (ready to mix system)  (Vancomycin IVPB with levels panel)      -- IV Every 24 hours (non-standard times) 12/24/17 0507          Antifungals       None            Microbiology Results (last 7 days)       Procedure Component Value Units Date/Time    Blood culture [[id number]]     Order Status:  Sent Specimen:  Blood     Blood culture [[id number]]     Order Status:  Sent Specimen:  Blood             Indication:   Bacteremia    Target Level: 15-20 mcg/ml    Hemodialysis:   No on N/A    Dosing Weight:   ABW--actual body weight  If ABW is greater than or equal to 30% over Ideal Body Weight, AdjBW will be used to calculate vancomycin dose.    Last Vancomycin dose: N/A   Date/Time given: N/A         Vancomycin level:  No results for input(s): VANCOMYCIN-TROUGH in the last 72 hours.  No results for input(s): VANCOMYCIN, RANDOM in the last 72 hours.    Per Protocol Initial/Adjustments " Dosin. Initial/Adjustment Dose: Loading dose = 1250 mg x1, followed by Maintenance dose of 1000 mg q24hr to be given at  08:00  date/time  2. Vancomycin Trough Level will be drawn on  @ 07:00 date/time    Pharmacy will continue to follow.    Please contact if you have any further questions. Thank you.    Jaiden Chino, PharmD  [phone]

## 2017-12-24 NOTE — PT/OT/SLP PROGRESS
Speech Language Pathology  Attempted Visit    Mika Valenzuela  MRN: [id number]    Patient not seen today secondary to Nursing hold (RN reported pt not appropriate for ST services at this time) Pt is somnolent and not arousable. SLP will follow-up when pt is appropriate to participate in ST services.    VAISHNAVI Lauren., CF-SLP  Speech-Language Pathologist   12/24/2017

## 2017-12-24 NOTE — PLAN OF CARE
12/23/17 1812   REYES Message   Medicare Outpatient and Observation Notification regarding financial responsibility Explained to patient/caregiver  (The sw spoke to the pt's sister Donna Valenzuela via phone and notified her the pt's in OBS and a copy was left at bedside for her and she states she or her other family will get it when they come back to the hospital. )   Date REYES was signed 12/23/17   Time REYES was signed [id number]

## 2017-12-24 NOTE — SIGNIFICANT EVENT
Call from nurse that patient spiked fever of 102.2F    Blood cx x2 ordered. Started on empiric abx Vanc/Zosyn. Tylenol suppository for fevers.    Mani Melton  [facility] Internal Medicine HO-II  [facility] Internal Medicine Service    [facility] Medicine Hospitalist Pager numbers:   [facility] Hospitalist Medicine Team A (Nerissa/Michael): [phone]  [facility] Hospitalist Medicine Team B (Enriqueta/Estiven):  [phone]

## 2017-12-25 LAB
ALBUMIN SERPL BCP-MCNC: 2.4 G/DL
ALP SERPL-CCNC: 77 U/L
ALT SERPL W/O P-5'-P-CCNC: 11 U/L
ANION GAP SERPL CALC-SCNC: 17 MMOL/L
AST SERPL-CCNC: 23 U/L
BACTERIA #/AREA URNS HPF: ABNORMAL /HPF
BASOPHILS # BLD AUTO: 0 K/UL
BASOPHILS NFR BLD: 0 %
BILIRUB SERPL-MCNC: 1.1 MG/DL
BILIRUB UR QL STRIP: NEGATIVE
BUN SERPL-MCNC: 14 MG/DL
CALCIUM SERPL-MCNC: 9.4 MG/DL
CHLORIDE SERPL-SCNC: 105 MMOL/L
CLARITY UR: CLEAR
CO2 SERPL-SCNC: 21 MMOL/L
COLOR UR: YELLOW
CREAT SERPL-MCNC: 0.9 MG/DL
DIFFERENTIAL METHOD: ABNORMAL
EOSINOPHIL # BLD AUTO: 0 K/UL
EOSINOPHIL NFR BLD: 0 %
ERYTHROCYTE [DISTWIDTH] IN BLOOD BY AUTOMATED COUNT: 14.6 %
EST. GFR  (AFRICAN AMERICAN): >60 ML/MIN/1.73 M^2
EST. GFR  (NON AFRICAN AMERICAN): >60 ML/MIN/1.73 M^2
GLUCOSE SERPL-MCNC: 74 MG/DL
GLUCOSE UR QL STRIP: NEGATIVE
HCT VFR BLD AUTO: 31.9 %
HGB BLD-MCNC: 10.7 G/DL
HGB UR QL STRIP: ABNORMAL
KETONES UR QL STRIP: NEGATIVE
LEUKOCYTE ESTERASE UR QL STRIP: NEGATIVE
LYMPHOCYTES # BLD AUTO: 0.9 K/UL
LYMPHOCYTES NFR BLD: 19.2 %
MCH RBC QN AUTO: 32.5 PG
MCHC RBC AUTO-ENTMCNC: 33.5 G/DL
MCV RBC AUTO: 97 FL
MICROSCOPIC COMMENT: ABNORMAL
MONOCYTES # BLD AUTO: 0.5 K/UL
MONOCYTES NFR BLD: 10.9 %
NEUTROPHILS # BLD AUTO: 3.2 K/UL
NEUTROPHILS NFR BLD: 69.7 %
NITRITE UR QL STRIP: NEGATIVE
PH UR STRIP: 7 [PH] (ref 5–8)
PLATELET # BLD AUTO: 123 K/UL
PMV BLD AUTO: 9.3 FL
POTASSIUM SERPL-SCNC: 3.4 MMOL/L
PROT SERPL-MCNC: 9.9 G/DL
PROT UR QL STRIP: NEGATIVE
RBC # BLD AUTO: 3.29 M/UL
RBC #/AREA URNS HPF: 7 /HPF (ref 0–4)
SODIUM SERPL-SCNC: 143 MMOL/L
SP GR UR STRIP: 1.01 (ref 1–1.03)
SQUAMOUS #/AREA URNS HPF: 2 /HPF
URN SPEC COLLECT METH UR: ABNORMAL
UROBILINOGEN UR STRIP-ACNC: ABNORMAL EU/DL
WBC # BLD AUTO: 4.58 K/UL
WBC #/AREA URNS HPF: 1 /HPF (ref 0–5)

## 2017-12-25 PROCEDURE — 25000003 PHARM REV CODE 250: Performed by: INTERNAL MEDICINE

## 2017-12-25 PROCEDURE — 25000003 PHARM REV CODE 250: Performed by: HOSPITALIST

## 2017-12-25 PROCEDURE — 36415 COLL VENOUS BLD VENIPUNCTURE: CPT

## 2017-12-25 PROCEDURE — 80053 COMPREHEN METABOLIC PANEL: CPT

## 2017-12-25 PROCEDURE — 63600175 PHARM REV CODE 636 W HCPCS: Performed by: HOSPITALIST

## 2017-12-25 PROCEDURE — 81000 URINALYSIS NONAUTO W/SCOPE: CPT

## 2017-12-25 PROCEDURE — G8997 SWALLOW GOAL STATUS: HCPCS | Mod: CK

## 2017-12-25 PROCEDURE — 94761 N-INVAS EAR/PLS OXIMETRY MLT: CPT

## 2017-12-25 PROCEDURE — 85025 COMPLETE CBC W/AUTO DIFF WBC: CPT

## 2017-12-25 PROCEDURE — 92610 EVALUATE SWALLOWING FUNCTION: CPT

## 2017-12-25 PROCEDURE — G8996 SWALLOW CURRENT STATUS: HCPCS | Mod: CM

## 2017-12-25 PROCEDURE — 63600175 PHARM REV CODE 636 W HCPCS: Performed by: STUDENT IN AN ORGANIZED HEALTH CARE EDUCATION/TRAINING PROGRAM

## 2017-12-25 PROCEDURE — 25000003 PHARM REV CODE 250: Performed by: STUDENT IN AN ORGANIZED HEALTH CARE EDUCATION/TRAINING PROGRAM

## 2017-12-25 PROCEDURE — 11000001 HC ACUTE MED/SURG PRIVATE ROOM

## 2017-12-25 PROCEDURE — 27000221 HC OXYGEN, UP TO 24 HOURS

## 2017-12-25 PROCEDURE — 87086 URINE CULTURE/COLONY COUNT: CPT

## 2017-12-25 PROCEDURE — 63600175 PHARM REV CODE 636 W HCPCS: Performed by: INTERNAL MEDICINE

## 2017-12-25 RX ORDER — GLYCOPYRROLATE 0.2 MG/ML
0.2 INJECTION INTRAMUSCULAR; INTRAVENOUS EVERY 8 HOURS PRN
Status: DISCONTINUED | OUTPATIENT
Start: 2017-12-25 | End: 2017-12-27 | Stop reason: HOSPADM

## 2017-12-25 RX ORDER — MORPHINE SULFATE 10 MG/ML
0.5 INJECTION INTRAMUSCULAR; INTRAVENOUS; SUBCUTANEOUS EVERY 4 HOURS PRN
Status: DISCONTINUED | OUTPATIENT
Start: 2017-12-25 | End: 2017-12-27 | Stop reason: HOSPADM

## 2017-12-25 RX ORDER — SODIUM CHLORIDE AND POTASSIUM CHLORIDE 300; 900 MG/100ML; MG/100ML
INJECTION, SOLUTION INTRAVENOUS CONTINUOUS
Status: DISPENSED | OUTPATIENT
Start: 2017-12-25 | End: 2017-12-26

## 2017-12-25 RX ORDER — LORAZEPAM 0.5 MG/1
0.5 TABLET ORAL EVERY 4 HOURS PRN
Status: DISCONTINUED | OUTPATIENT
Start: 2017-12-25 | End: 2017-12-27 | Stop reason: HOSPADM

## 2017-12-25 RX ADMIN — DORZOLAMIDE HYDROCHLORIDE AND TIMOLOL MALEATE 1 DROP: 20; 5 SOLUTION/ DROPS OPHTHALMIC at 08:12

## 2017-12-25 RX ADMIN — SODIUM CHLORIDE AND POTASSIUM CHLORIDE 125 ML/HR: 9; 2.98 INJECTION, SOLUTION INTRAVENOUS at 12:12

## 2017-12-25 RX ADMIN — PIPERACILLIN SODIUM AND TAZOBACTAM SODIUM 4.5 G: 4; .5 INJECTION, POWDER, FOR SOLUTION INTRAVENOUS at 06:12

## 2017-12-25 RX ADMIN — VANCOMYCIN HYDROCHLORIDE 1000 MG: 1 INJECTION, POWDER, LYOPHILIZED, FOR SOLUTION INTRAVENOUS at 11:12

## 2017-12-25 RX ADMIN — ENOXAPARIN SODIUM 40 MG: 100 INJECTION SUBCUTANEOUS at 06:12

## 2017-12-25 RX ADMIN — PIPERACILLIN SODIUM AND TAZOBACTAM SODIUM 4.5 G: 4; .5 INJECTION, POWDER, FOR SOLUTION INTRAVENOUS at 03:12

## 2017-12-25 RX ADMIN — LATANOPROST 1 DROP: 50 SOLUTION/ DROPS OPHTHALMIC at 08:12

## 2017-12-25 RX ADMIN — LABETALOL HYDROCHLORIDE 5 MG: 5 INJECTION, SOLUTION INTRAVENOUS at 05:12

## 2017-12-25 RX ADMIN — ACETAMINOPHEN 650 MG: 650 SUPPOSITORY RECTAL at 08:12

## 2017-12-25 RX ADMIN — SODIUM CHLORIDE AND POTASSIUM CHLORIDE: 9; 2.98 INJECTION, SOLUTION INTRAVENOUS at 09:12

## 2017-12-25 RX ADMIN — DORZOLAMIDE HYDROCHLORIDE AND TIMOLOL MALEATE 1 DROP: 20; 5 SOLUTION/ DROPS OPHTHALMIC at 11:12

## 2017-12-25 NOTE — PLAN OF CARE
Problem: SLP Goal  Goal: SLP Goal  Short Term Goals:  1. Pt will remain awake and alert for up to 20 minutes given min assist.   2. Pt will communication basic wants and needs >50% of the time given min assist.   3. Pt will consume 5/5 ice chips without overt s/s of aspiration given max assist.   4. Pt will consume 3oz of thin liquid without overt s/s of aspiration given max assist.   5. Pt will consume 2oz of puree without overt s/s of aspiration given max assist.   Outcome: Ongoing (interventions implemented as appropriate)  Completed bedside swallow evaluation with speech. Pt is not safe for PO intake at this time. Recommend continued NPO with alternate means of nutrition/hydration.

## 2017-12-25 NOTE — PLAN OF CARE
Problem: Patient Care Overview  Goal: Plan of Care Review  Outcome: Ongoing (interventions implemented as appropriate)  Patient is non verbal and responds to vigorous stimulation. BP and temperature increased around 0500. PRN BP med and tylenol suppository given. Bed alarm set, bed in low and locked position, side rails up x 2, call light within reach. Continue to monitor.

## 2017-12-25 NOTE — PROGRESS NOTES
[facility] Im Resident YAN Progress Note    Subjective:       Patient febrile overnight to 102.2. Patient still responding only to pain/discomfort. Does awaken to sternal rub or voice command. No family at bedside to speak with.      Objective:   Last 24 Hour Vital Signs:  BP  Min: 139/81  Max: 187/97  Temp  Av.1 °F (37.8 °C)  Min: 98.4 °F (36.9 °C)  Max: 102.2 °F (39 °C)  Pulse  Av.4  Min: 86  Max: 133  Resp  Av.6  Min: 16  Max: 24  SpO2  Av.3 %  Min: 91 %  Max: 94 %  I/O last 3 completed shifts:  In: 0   Out: 4560 [Urine:4560]    Physical Examination:  General: Thin, malnourished male, NAD, does not awaken to to sternal rub or voice command.   Head: Normocephalic and atraumatic  Eyes:  Corneal opacity to right eye. Conjunctivitis noted bilaterally patient clamps eyes shut during eye examination.  Mouth:  Poor dentition, moist mucous membranes  Cardio:tachycardia in 130s, normal rhythm with normal S1 and S2; no murmurs or rubs  Resp: Not requiring supplemental oxygen, CTAB without wheezing or crackles. Patient breathing 36 times per minute  Abdom:  Nondistended, BS+, soft, nontender to palpation  Extrem: No lower extremity edema, 2+ distal pulses  Skin: No rashes, lesions, or color changes  Pulses: 2+ and symmetric distally  Neuro: Nonverbal, patient withdraws to pain, does not awaken to sternal rub, Unable to assess strength secondary to inability to participate.      Laboratory:  Laboratory Data Reviewed:   Pertinent Findings:  Recent Labs      17   0700   WBC  6.14  4.58   HGB  11.1*  10.7*   HCT  33.8*  31.9*   PLT  170  123*   MCV  100*  97   RDW  15.0*  14.6*   GRAN  59.4  3.7  69.7  3.2   LYMPH  28.8  1.8  19.2  0.9*   MONO  11.4  0.7  10.9  0.5   EOS  0.0  0.0   BASO  0.01  0.00   EOSINOPHIL  0.2  0.0   BASOPHIL  0.2  0.0       Recent Labs      17   0516  17   1053  17   0700   NA  149*  149*  145  143   K  4.4  3.9  3.1*  3.4*    CL  108  107  105  105   CO2  25  27  26  21*   BUN  22  19  14  14   CREATININE  1.1  1.0  1.0  0.9   GLU  85  74  98  74     Recent Labs      12/22/17   1857  12/22/17   2306  12/23/17   1035   POCTGLUCOSE  97  82  83     Recent Labs      12/22/17   1846  12/23/17   0516  12/24/17   1053  12/25/17   0700   PROT  11.5*  11.2*  10.6*  9.9*   ALBUMIN  3.2*  3.0*  2.7*  2.4*   BILITOT  1.1*  1.2*  1.8*  1.1*   AST  24  23  25  23   ALT  12  12  11  11   ALKPHOS  108  101  91  77         Microbiology Data Reviewed:   Pertinent Findings:  Microbiology Results (last 7 days)     Procedure Component Value Units Date/Time    Urine culture [[id number]] Collected:  12/25/17 0502    Order Status:  Sent Specimen:  Urine from Urine, Catheterized Updated:  12/25/17 0502    Blood culture [[id number]] Collected:  12/24/17 1053    Order Status:  Completed Specimen:  Blood Updated:  12/24/17 1945     Blood Culture, Routine No Growth to date    Blood culture [[id number]] Collected:  12/24/17 1053    Order Status:  Completed Specimen:  Blood Updated:  12/24/17 1945     Blood Culture, Routine No Growth to date            Other Results:  EKG (my interpretation): Sinus tachycardia with rate of 120. Q waves in anterior leads.     Radiology Data Reviewed:   Pertinent Findings:  Imaging Results          X-Ray Chest 1 View (Final result)  Result time 12/24/17 09:34:09    Final result by Charlie Potter MD (12/24/17 09:34:09)                 Impression:     Interval development of right perihilar consolidation.      Electronically signed by: CHARLIE POTTER MD  Date:     12/24/17  Time:    09:34              Narrative:    Chest x-ray, 1 view    Comparison: 12/22/17    Findings: There is interval development of right perihilar consolidation. No pleural effusion or pneumothorax.  Cardiac silhouette is unremarkable.                             CT Abdomen Pelvis With Contrast / no oral contrast (Final result)  Result time 12/22/17 21:23:58    Final  result by Yamil Rivera MD (12/22/17 21:23:58)                 Impression:        1.  Moderate to large amount of colonic and rectal fecal material suggesting constipation with prominent rectal stool ball which could represent mild impaction. No associated bowel obstruction.    2. Urinary bladder circumferential wall thickening without adjacent inflammatory change, which may be related to under distention versus cystitis. Correlate clinically.    3. Otherwise, no acute process seen.    4. Bilateral renal simple cysts and subcentimeter low-attenuation cortical foci which are too small to characterize.    5. Gross stable additional chronic findings as above.      Electronically signed by: YAMIL RIVERA MD, MD  Date:     12/22/17  Time:    21:23              Narrative:    CT of the abdomen and pelvis with 75 cc of Omnipaque 350 IV contrast. No oral contrast. Delayed images were obtained.    Results: Comparison made to chest radiograph same day, renal ultrasound 10/13/17 and CT renal stone study 2/14/15. Respiratory motion and also beam hardening streak artifact somewhat limits evaluation.    The lung bases show mild dependent atelectasis and scattered linear opacities consistent with subsegmental scarring versus atelectasis.  The visualized heart is enlarged without significant pericardial fluid. Coronary arterial and aortic annular calcifications are noted.    The liver, gallbladder, pancreas, spleen, stomach, duodenum, and adrenal glands are without significant abnormality.  No intrahepatic or extrahepatic biliary ductal dilatation.    The kidneys are grossly stable in size, shape and location concentrating and excreting contrast appropriately. There are several low-attenuation cortical foci at both kidneys, some of which appears simple while others are subcentimeter and too small to characterize.  No hydronephrosis.   The urinary bladder is suboptimally distended with circumferential wall thickening but no adjacent  inflammatory change.  Prostate is upper limits of normal in size with coarse calcifications in the central gland, similar to prior. Pelvic phleboliths noted.    No ascites or free air.  No lymphadenopathy.    Left lower quadrant distal colonic bowel anastomosis unchanged. Moderate to large amount of scattered fecal material throughout the colon and rectum, noting prominent stool ball within the rectum without rectal wall thickening or perirectal stranding. The appendix and terminal ileum appear within normal limits.  The small and large loops of bowel are normal in caliber without focal wall thickening or adjacent fat stranding.  No pneumatosis or portal venous gas.    Diffuse advanced calcific atherosclerosis. Abdominal aorta is atherosclerotic and minimally ectatic without aneurysm or dissection.    The osseous structures appear grossly stable without acute process seen.                             CT Head Without Contrast (Final result)  Result time 12/22/17 21:14:45    Final result by Yamil Rivera MD (12/22/17 21:14:45)                 Impression:        1.  No acute large vascular territory infarct or intracranial hemorrhage identified. Further evaluation/followup as warranted.    2.  Senescent changes as above.      Electronically signed by: YAMIL RIVERA MD, MD  Date:     12/22/17  Time:    21:14              Narrative:    COMPARISON: Head CT 9/8/17    FINDINGS: No evidence of hemorrhage, mass, midline shift or acute major vascular territory infarct.  Gray white interface appears intact.  There is age appropriate  generalized cerebral atrophy.  Grossly stable distribution of patchy and confluent low attenuation changes throughout the subcortical and periventricular white matter, nonspecific but can be seen with chronic small vessel ischemic disease.  No new focal areas of abnormal parenchymal attenuation. The ventricles are midline without distortion by mass effect.  No extra-axial hemorrhage or mass. Skull  base  vascular calcifications are noted.     The extracranial structures are within normal limits.  Calvarium is intact.  Visualized paranasal sinuses are clear.  Mastoid air cells are clear. Stable right globe abnormality and prior surgical change to the left ocular lens. In                             X-Ray Chest AP Portable (Final result)  Result time 12/22/17 19:44:31    Final result by Yamil Rivera MD (12/22/17 19:44:31)                 Impression:        As above.      Electronically signed by: YAMIL RIVERA MD, MD  Date:     12/22/17  Time:    19:44              Narrative:    COMPARISON: Chest radiograph 9/22/17    FINDINGS: AP portable view of the chest. Patient is rotated. Skin fold projects over the right hemithorax. Left lung apex is partially obscured by overlying chin osseous and soft tissue structures.     Cardiomediastinal silhouette appears stable. Bilateral diffuse chronic interstitial coarsening grossly similar to multiple priors, nonspecific. Otherwise, the lungs are symmetrically normally inflated without large consolidation or new focal opacity. No large pleural effusion or pneumothorax. No acute osseous process seen.   PA and lateral views can be obtained.                                Current Medications:     Infusions:       Scheduled:   cyanocobalamin  1,000 mcg Oral Daily    dorzolamide-timolol 2-0.5%  1 drop Both Eyes BID    enoxaparin  40 mg Subcutaneous Daily    latanoprost  1 drop Both Eyes QHS    piperacillin-tazobactam 4.5 g in sodium chloride 0.9% 100 mL IVPB (ready to mix system)  4.5 g Intravenous Q8H    polyethylene glycol  17 g Oral Daily    vancomycin (VANCOCIN) IVPB  1,000 mg Intravenous Q24H        PRN:  acetaminophen, dextrose 50%, dextrose 50%, glucagon (human recombinant), labetalol, sodium chloride 0.9%    Antibiotics and Day Number of Therapy:  Vanc and Zosyn day 2    Lines and Day Number of Therapy:  PIV    Assessment:     Mika Valenzuela is a 82 y.o.male  with  Patient Active Problem List    Diagnosis Date Noted    Acute hypernatremia     Failure to thrive in adult 12/22/2017    Hypercalcemia of malignancy 12/22/2017    Fecal impaction in rectum 12/22/2017    Vascular dementia without behavioral disturbance 06/30/2017    Peripheral neuropathy 06/01/2015    Compression fracture of L5 lumbar vertebra 01/14/2015    Anemia of chronic disease 12/30/2014    Glaucoma 12/30/2014    HLD (hyperlipidemia) 12/30/2014    Severe sepsis 12/29/2014    BPH (benign prostatic hyperplasia) 12/29/2014    Lumbosacral radiculopathy 12/03/2014    Sacroiliitis 11/03/2014    Chronic low back pain 09/17/2014    IgA myeloma 02/28/2014    Constipation 09/18/2013    B12 deficiency anemia 08/26/2013    Chronic hepatitis C 07/19/2012    HTN (hypertension) 07/19/2012        Plan:     Severe Sepsis   -Patient spiked fever to 103 this morning.   -Started on Vancomycin and Zosyn.  -Fever, HR >90, Lactic acidosis  -Started on Vanc and Zosyn   -Blood cultures x 2 - NGTD x 1 say  -UA - 1+ occult blood with 2.0-3.0 urobilogen, 7 RBCs/hpf occsasional bacteria, 1 WBC/hpf  - s/p 1L bolus x 1   -CXR - interval development of right perihilar consolidation       Failure to thrive  - Patient with decreased PO intake over the past three days  - Hypernatremic to 149 with dry mucous membranes on presentation   - Patient with history of multiple myeloma  - CT abdomen/pelvis - no masses noted   - CT head - no masses noted  - Lactic acid elevated at 2.9  - Received 2L IVF in ED --> fluid recuscitation with continuous IV normal saline at 125cc/hr administered overnight with improvement   - Aspiration precuations  - Consult nutrition and speech therapy. Patient unable to participate in swallow study. Nutrition Signing off.   - Patient still not responsive this morning, withdraws to pain. Febrile overnight. Now with AG Acidosis at 17. Lactic Acid increased to 2.8 on 12/24/2017     Hypercalcemia  -   Calcium elevated at 12.1 on presentation, increased from baseline of 9.5  - Will work-up with calcitrol (pending) , PTH decreased at 5.2, and PTHrP (pending)  -Received 1 dose of pamidronate on 12/23/2017  - Corrected calcium 10.68 12/25/2017     Constipation  - CT abdomen/pelvis - moderate to large amount of colonic and rectal fecal material suggestion constipation with prominent rectal stool ball , no associted obstruction  - Soap suds enema administered --> patient with bowel movement overnight on 12/23/2017  - Abdomen soft this morning  - Continue to monitor     IgA kappa ultiple myeloma  - Followed by heme-onc, last seen October 2017  - Treated with Velcade and dexamethasone September 2014-December 2014; second course of treatment from March 2016-February 2017  - Last chemotherapy treatment February 2017   - not a candidate for any further chemotherapy secondary to progressively worsening vascular dementia  - Continue follow-up with heme-onc     Vascular dementia  - Followed by neurology, last seen August 2017  - Progressively worsening, denies history of CVA  - Continue home medications which include Namenda 5mg PO daily and Efefxor XR 37.5mg PO nightly. Discontinued 2/2 to patient being unable to participate in/ pass a swallow study.   - CT head with senescent changes  - Neurology follow-up scheduled 2/9/18      HTN:  - BP elevated at 167/93 on presentation  - Home meds include atenolol 50mg PO daily and amlodipine 10mg PO daily - Holding as patient has not been able to participate in a swallow study.   - Continue home medications when patient able to tolerate PO  - PRN labetalol for SBP >180 if patient unable to swallow medications  - /95 this morning  - Continue to monitor in setting of sepsis- PRN labetalol order discontinued.      Peripheral polyneuropathy  - Continue gabapentin 200mg PO TID  - No acute issues     Normocytic anemia  - H/H 11.1/33.8 with , increased from baseline hemoglobin  10.4, likely secondary to hemoconcentration  - Likely secondary to chronic disease  - Work-up with iron, TIBC, ferritin, folate, and B12  - Low B12 - Cyanocobalamin tablet 1,000 mcg daily     BPH:  - Continue home medication including tamulosin 0.4mg PO daily  - Patient with urinary incontinence  - No astorga catheter  - Bladder scans as needed     Right eye blindness  - No acute issues    Disposition: Transition to hospice care.    Fitz Porter  [facility] Internal Medicine HO-I  [facility] IM Service Team A    [facility] Medicine Hospitalist Pager numbers:   [facility] Hospitalist Medicine Team A (Nerissa/Michael): [phone]  [facility] Hospitalist Medicine Team B (Enriqueta/Estiven):  [phone]

## 2017-12-25 NOTE — PT/OT/SLP EVAL
Speech Language Pathology Evaluation  Bedside Swallow    Patient Name:  Mika Valenzuela   MRN:  [id number]  Admitting Diagnosis: Hypercalcemia of malignancy    Recommendations:                 General Recommendations:  Dysphagia therapy and Speech/language therapy  Diet recommendations:  NPO, NPO   General Precautions: Standard, aspiration    History:     Past Medical History:   Diagnosis Date    Anemia     Back pain     BPH (benign prostatic hypertrophy)     Chronic constipation     Colon polyp     Diverticulosis     GERD (gastroesophageal reflux disease)     Glaucoma (increased eye pressure)     Hepatitis C     Hypertension     Kidney stone     Myeloma     Urinary tract infection        Past Surgical History:   Procedure Laterality Date    carotid endarterectomy      left    COLON SURGERY       Mika Valenzuela is a 82 y.o. male with admitted with failure to thrive including dcr PO intake, hypernatremia, dementia and multiple myeloma. CT abdomen/pelvis - no masses noted. CT head - no masses noted      Subjective     Sleeping initially. Opened eyes and produced vocalic vocalizations given max, consistent cues. No response to questions or commands. No communication attempts. Dependent on communication partner to anticipate needs at this time.     Objective:     Oral Musculature Evaluation  · Oral Musculature: general weakness  · Dentition: scattered dentition  · Oral Labial Strength and Mobility: impaired seal  · Lingual Strength and Mobility: impaired strength  · Voice Prior to PO Intake: Vocal quality is weak and breathy with low volume.   · Oral Musculature Comments: No verbalizations elicited. Only vocalic vocalizations with max assist.     Bedside Swallow Eval:   Consistencies Assessed:  · Thin liquids - attempted trials with ice chips and thin liquid via straw     Oral Phase: Opened mouth in response to ice on lips of spoon. Anterior spillage of 100% of thin liquid trials with no active bolus  manipulation or A-P transit noted for any trials or consistencies. Suctioned all water via yankauer after attempted trials.     Pharyngeal Phase:   · No volitional or reflexive swallowing elicited during swallow study.         Assessment:     Mika Valenzuela is a 82 y.o. male with an SLP diagnosis of Dysphagia and cognitive communication deficits.   He is not safe to initiate PO intake at this time. SLP with continue to follow.     Goals:    SLP Goals        Problem: SLP Goal    Goal Priority Disciplines Outcome   SLP Goal     SLP           Problem: SLP Goal    Goal Priority Disciplines Outcome   SLP Goal     SLP Ongoing (interventions implemented as appropriate)   Description:  Short Term Goals:  1. Pt will remain awake and alert for up to 20 minutes given min assist.   2. Pt will communication basic wants and needs >50% of the time given min assist.   3. Pt will consume 5/5 ice chips without overt s/s of aspiration given max assist.   4. Pt will consume 3oz of thin liquid without overt s/s of aspiration given max assist.   5. Pt will consume 2oz of puree without overt s/s of aspiration given max assist.                     Plan:     · Patient to be seen:  3 x/week   · Plan of Care expires:  01/23/18  · Plan of Care reviewed with:      · SLP Follow-Up:  Yes           Time Tracking:     SLP Treatment Date:   12/25/17  Speech Start Time:  1217  Speech Stop Time:  1225     Speech Total Time (min):  8 min    Billable Minutes: Eval Swallow and Oral Function 10 minutes    Janes King CCC-SLP  12/25/2017

## 2017-12-26 LAB
BACTERIA UR CULT: NO GROWTH
TB INDURATION 48 - 72 HR READ: 0 MM

## 2017-12-26 PROCEDURE — 25000003 PHARM REV CODE 250: Performed by: HOSPITALIST

## 2017-12-26 PROCEDURE — 63600175 PHARM REV CODE 636 W HCPCS: Performed by: STUDENT IN AN ORGANIZED HEALTH CARE EDUCATION/TRAINING PROGRAM

## 2017-12-26 PROCEDURE — 94761 N-INVAS EAR/PLS OXIMETRY MLT: CPT

## 2017-12-26 PROCEDURE — 27000221 HC OXYGEN, UP TO 24 HOURS

## 2017-12-26 PROCEDURE — 63600175 PHARM REV CODE 636 W HCPCS: Performed by: HOSPITALIST

## 2017-12-26 PROCEDURE — 11000001 HC ACUTE MED/SURG PRIVATE ROOM

## 2017-12-26 RX ADMIN — SODIUM CHLORIDE AND POTASSIUM CHLORIDE: 9; 2.98 INJECTION, SOLUTION INTRAVENOUS at 05:12

## 2017-12-26 RX ADMIN — MORPHINE SULFATE 0.5 MG: 10 INJECTION INTRAVENOUS at 05:12

## 2017-12-26 RX ADMIN — LABETALOL HYDROCHLORIDE 5 MG: 5 INJECTION, SOLUTION INTRAVENOUS at 12:12

## 2017-12-26 RX ADMIN — DORZOLAMIDE HYDROCHLORIDE AND TIMOLOL MALEATE 1 DROP: 20; 5 SOLUTION/ DROPS OPHTHALMIC at 09:12

## 2017-12-26 RX ADMIN — MORPHINE SULFATE 0.5 MG: 10 INJECTION INTRAVENOUS at 08:12

## 2017-12-26 RX ADMIN — LATANOPROST 1 DROP: 50 SOLUTION/ DROPS OPHTHALMIC at 08:12

## 2017-12-26 RX ADMIN — DORZOLAMIDE HYDROCHLORIDE AND TIMOLOL MALEATE 1 DROP: 20; 5 SOLUTION/ DROPS OPHTHALMIC at 08:12

## 2017-12-26 NOTE — PROGRESS NOTES
[facility] IM Resident YAN Progress Note    Subjective:      No acute events overnight. Patient slightly more responsive this morning than yesterday. Opens his eyes to sternal rub, withdraws from pain, and make faint grunts when spoken to. Unable to tell me if he is in any pain. No family at bedside.     Objective:   Last 24 Hour Vital Signs:  BP  Min: 137/91  Max: 167/86  Temp  Av °F (37.8 °C)  Min: 98.4 °F (36.9 °C)  Max: 101.2 °F (38.4 °C)  Pulse  Av  Min: 88  Max: 135  Resp  Av.1  Min: 14  Max: 24  SpO2  Av.2 %  Min: 91 %  Max: 97 %  I/O last 3 completed shifts:  In: 0   Out: 4050 [Urine:4050]    Physical Examination:  General: Thin, malnourished male, NAD, opens eyes and grunts to sternal rum.   Eyes:  Corneal opacity to right eye. Left pupil 1-2 mm, do not appreciate any response to light. Patient clamps eyes shut during eye examination  Mouth:  Poor dentition, moist mucous membranes  Cardio: tachycardic, normal rhythm with normal S1 and S2; no murmurs or rubs  Resp: On 3L NC, CTAB without wheezing or crackles. Patient breathing 20 times per minute  Abdom:  Nondistended, BS+, soft, nontender to palpation  Extrem: No lower extremity edema, 2+ distal pulses  Skin: No rashes, lesions, or color changes  Pulses: 2+ and symmetric distally  Neuro: Nonverbal, patient withdraws to pain, does not awaken to sternal rub, Unable to assess strength secondary to inability to participate.     Laboratory:  Laboratory Data Reviewed:   Pertinent Findings:  none    Microbiology Data Reviewed:   Pertinent Findings  Microbiology Results (last 7 days)     Procedure Component Value Units Date/Time    Blood culture [[id number]] Collected:  17 1053    Order Status:  Completed Specimen:  Blood Updated:  17 1412     Blood Culture, Routine No Growth to date     Blood Culture, Routine No Growth to date    Blood culture [[id number]] Collected:  17 1053    Order Status:  Completed Specimen:  Blood Updated:   12/25/17 1412     Blood Culture, Routine No Growth to date     Blood Culture, Routine No Growth to date    Urine culture [[id number]] Collected:  12/25/17 0502    Order Status:  Sent Specimen:  Urine from Urine, Catheterized Updated:  12/25/17 1004          Other Results:  EKG (my interpretation):     Radiology Data Reviewed:   Pertinent Findings:      Current Medications:     Infusions:   0.9% NACL & POTASSIUM CHLORIDE 40 MEQ/L 125 mL/hr at 12/26/17 0529        Scheduled:   dorzolamide-timolol 2-0.5%  1 drop Both Eyes BID    latanoprost  1 drop Both Eyes QHS        PRN:  acetaminophen, glycopyrrolate, LORazepam, morphine, sodium chloride 0.9%    Antibiotics and Day Number of Therapy:  None    Lines and Day Number of Therapy:  None    Assessment:     Mika Valenzuela is a 82 y.o.male with  Patient Active Problem List    Diagnosis Date Noted    Tachycardia     Acute hypernatremia     Failure to thrive in adult 12/22/2017    Hypercalcemia of malignancy 12/22/2017    Fecal impaction in rectum 12/22/2017    Vascular dementia without behavioral disturbance 06/30/2017    Peripheral neuropathy 06/01/2015    Compression fracture of L5 lumbar vertebra 01/14/2015    Anemia of chronic disease 12/30/2014    Glaucoma 12/30/2014    HLD (hyperlipidemia) 12/30/2014    Severe sepsis 12/29/2014    BPH (benign prostatic hyperplasia) 12/29/2014    Lumbosacral radiculopathy 12/03/2014    Sacroiliitis 11/03/2014    Chronic low back pain 09/17/2014    IgA myeloma 02/28/2014    Constipation 09/18/2013    B12 deficiency anemia 08/26/2013    Chronic hepatitis C 07/19/2012    HTN (hypertension) 07/19/2012        Plan:     Severe Sepsis   -Patient spiked fever to 103 this morning.   -Started on Vancomycin and Zosyn.  -Fever, HR >90, Lactic acidosis  -Started on Vanc and Zosyn   -Blood cultures x 2 - NGTD x 1 say  -UA - 1+ occult blood with 2.0-3.0 urobilogen, 7 RBCs/hpf occsasional bacteria, 1 WBC/hpf  - s/p 1L bolus x 1    -CXR - interval development of right perihilar consolidation   -Transition to comfort care      Failure to thrive  - Patient with decreased PO intake over the past three days  - Hypernatremic to 149 with dry mucous membranes on presentation   - Patient with history of multiple myeloma  - CT abdomen/pelvis - no masses noted   - CT head - no masses noted  - Lactic acid elevated at 2.9  - Received 2L IVF in ED --> fluid recuscitation with continuous IV normal saline at 125cc/hr administered overnight with improvement   - Aspiration precuations  - Consult nutrition and speech therapy. Patient unable to participate in swallow study. Nutrition Signing off.   - Patient still not responsive this morning, withdraws to pain. Febrile overnight. Now with AG Acidosis at 17. Lactic Acid increased to 2.8 on 12/24/2017  -Transition to comfort care    Hypercalcemia  -  Calcium elevated at 12.1 on presentation, increased from baseline of 9.5  - Will work-up with calcitrol (pending) , PTH decreased at 5.2, and PTHrP (pending)  -Received 1 dose of pamidronate on 12/23/2017  - Corrected calcium 10.68 12/25/2017  - Transition to comfort care       Constipation  - CT abdomen/pelvis - moderate to large amount of colonic and rectal fecal material suggestion constipation with prominent rectal stool ball , no associted obstruction  - Soap suds enema administered --> patient with bowel movement overnight on 12/23/2017  - Abdomen soft this morning  - Continue to monitor  -Transition  to comfort care     IgA kappa ultiple myeloma  - Followed by heme-onc, last seen October 2017  - Treated with Velcade and dexamethasone September 2014-December 2014; second course of treatment from March 2016-February 2017  - Last chemotherapy treatment February 2017   - not a candidate for any further chemotherapy secondary to progressively worsening vascular dementia  -follow up with heme-onc  -Transition to comfort care     Vascular dementia  - Followed by  neurology, last seen August 2017  - Progressively worsening, denies history of CVA  - Continue home medications which include Namenda 5mg PO daily and Efefxor XR 37.5mg PO nightly. Discontinued 2/2 to patient being unable to participate in/ pass a swallow study.   - CT head with senescent changes  - Neurology follow-up scheduled 2/9/18   -Transition to comfort care     HTN:  - BP elevated at 167/93 on presentation  - Home meds include atenolol 50mg PO daily and amlodipine 10mg PO daily - Holding as patient has not been able to participate in a swallow study.   - Continue home medications when patient able to tolerate PO  - PRN labetalol for SBP >180 if patient unable to swallow medications  - /95 this morning  - Continue to monitor in setting of sepsis- PRN labetalol order discontinued.   -Transition to comfort care     Peripheral polyneuropathy  - Continue gabapentin 200mg PO TID  - No acute issues  -Transition to comfort care     Normocytic anemia  - H/H 11.1/33.8 with , increased from baseline hemoglobin 10.4, likely secondary to hemoconcentration  - Likely secondary to chronic disease  - Work-up with iron, TIBC, ferritin, folate, and B12  - Low B12 - Cyanocobalamin tablet 1,000 mcg daily  -Transition to comfort care     BPH:  - Continue home medication including tamulosin 0.4mg PO daily  - Patient with urinary incontinence  - No astorga catheter  - Bladder scans as needed  -Transition to comfort care.      Right eye blindness  - No acute issues  -Transition to comfort care    Disposition: Transition to hospice care.    Fitz Porter  [facility] Internal Medicine HO-I  [facility] IM Service Team A    [facility] Medicine Hospitalist Pager numbers:   [facility] Hospitalist Medicine Team A (Nerissa/Michael): [phone]  [facility] Hospitalist Medicine Team B (Enriqueta/Estiven):  [phone]

## 2017-12-26 NOTE — PROGRESS NOTES
The Sw spoke to Hortencia at [facility] and they're interested in the pt also. The pt was accepted to [facility] via [facility]. Devorah at [facility] is still reviewing the pt's info.     1:47pm The pt has been accepted to [facility].

## 2017-12-26 NOTE — PLAN OF CARE
Problem: Patient Care Overview  Goal: Plan of Care Review  Outcome: Ongoing (interventions implemented as appropriate)  Plan of care reviewed with patient. Patient in non verbal. Fall precautions maintained. Per Dr Hendricks patient in comfort care. Patient slept most of the shift .Patient on telemetry throughout shift with no ectopy noted. Will continue to monitor.  Bed in lowest position, locked, call light within reach, and bed alarm on. Side rails up x's 2 with slip resistant socks on. Nurse instructed patient to notify staff for any assistance and pt verbalized complete understanding.

## 2017-12-26 NOTE — PROGRESS NOTES
"  [facility]  Adult Nutrition  Consult Note    SUMMARY     Recommendations    Recommendation/Intervention:   1. Noted comfort care   2. RD to monitor    Goals: Comfort care/hospice  Nutrition Goal Status: new  Communication of RD Recs: reviewed with RN    Continuum of Care Plan    Referral to Outpatient Services:  (D/C planning: Comfort care)    Reason for Assessment    Reason for Assessment: identified at risk by screening criteria  Diagnosis:  (Severe sepsis)  Relevent Medical History: HTN, GERD   Interdisciplinary Rounds: did not attend     General Information Comments: SLP rec NPO. Patient changed to comfort care. Plan for hospice. Poorly responsive. Patient appears underweight.      Nutrition Prescription Ordered    Current Diet Order: NPO      Evaluation of Received Nutrients/Fluid Intake     Energy Calories Required: not meeting needs  Protein Required: not meeting needs      IV Fluid (mL): 3000     I/O: 1168/not recorded       Fluid Required:  (per MD-comfort care)     Nutrition Risk Screen     Nutrition Risk Screen: other (see comments) (Nonverbal)    Nutrition/Diet History    Food Preferences: KENISHA    Labs/Tests/Procedures/Meds     Pertinent Labs Reviewed: reviewed   Pertinent Medications Reviewed: reviewed     Physical Findings    Overall Physical Appearance: loss of muscle mass, loss of subcutaneous fat, underweight   Oral/Mouth Cavity: tooth/teeth missing  Skin: intact    Anthropometrics    Temp: 97.6 °F (36.4 °C)     Height: 5' 6" (167.6 cm)  Weight Method: Bed Scale  Weight: 61.7 kg (136 lb 0.4 oz)  Ideal Body Weight (IBW), Male: 142 lb     % Ideal Body Weight, Male (lb): 95.8 lb     BMI (Calculated): 22  BMI Grade: 18.5-24.9 - normal     Estimated/Assessed Needs    Weight Used For Calorie Calculations: 61.7 kg (136 lb 0.4 oz)      Energy Calorie Requirements (kcal): 1850 kcal  Energy Need Method: Kcal/kg     RMR ([place]-St. Jeor Equation): 1259.75      Weight Used For Protein " Calculations: 61.7 kg (136 lb 0.4 oz)  Protein Requirements: 75 g    Fluid Need Method: RDA Method        RDA Method (mL): 1850      Assessment and Plan    Nutrition Dx; Inadequate energy intake r/t comfort care as evidenced by: SLP rec NPO: comfort care/hospice  Nutrition Dx: New      Monitor and Evaluation    Food and Nutrient Intake: energy intake, food and beverage intake, enteral nutrition intake  Food and Nutrient Adminstration: diet order, enteral and parenteral nutrition administration   Physical Activity and Function: nutrition-related ADLs and IADLs  Anthropometric Measurements: weight, weight change  Biochemical Data, Medical Tests and Procedures: electrolyte and renal panel  Nutrition-Focused Physical Findings: overall appearance    Nutrition Risk    Level of Risk:  (1 x week)    Nutrition Follow-Up    RD Follow-up?: Yes

## 2017-12-26 NOTE — PLAN OF CARE
ARMANI working on NHP with hospice. State closed today, SW unable to call in [facility].        12/26/17 1100   Discharge Reassessment   Assessment Type Discharge Planning Reassessment   Discharge Plan A New Nursing Home placement - intermediate care facility   Can the patient answer the patient profile reliably? No, cognitively impaired     Lori Rider, RN, [facility], CMSRN  RN Transition Navigator  [phone]

## 2017-12-26 NOTE — PLAN OF CARE
Problem: Patient Care Overview  Goal: Plan of Care Review  Outcome: Ongoing (interventions implemented as appropriate)   Patient on comfort care at this time, Patient has been resting comfortably throughout the shift.  Patient did not require any pain medication during the shift. The patient had 3 family members come to visit him today.  I will continue to monitor the patient.

## 2017-12-26 NOTE — PLAN OF CARE
Recommendations     Recommendation/Intervention:   1. Noted comfort care   2. RD to monitor     Goals: Comfort care/hospice  Nutrition Goal Status: new  Communication of RD Recs: reviewed with RN     Continuum of Care Plan     Referral to Outpatient Services:  (D/C planning: Comfort care)

## 2017-12-26 NOTE — PROGRESS NOTES
The Sw was denied at [facility] via [facility].     10:40am The Sw spoke to Devorah at [facility] and faxed her current info on the pt. The Sw spoke to Cee at [facility] and she stated they have medically accepted the pt but the pt will not be able to admit to any nh until the Sw receives the 142 and the state's closed today. Devorah and Cee have been made aware.

## 2017-12-27 VITALS
SYSTOLIC BLOOD PRESSURE: 122 MMHG | BODY MASS INDEX: 21.86 KG/M2 | HEIGHT: 66 IN | TEMPERATURE: 98 F | OXYGEN SATURATION: 95 % | DIASTOLIC BLOOD PRESSURE: 68 MMHG | HEART RATE: 114 BPM | RESPIRATION RATE: 22 BRPM | WEIGHT: 136 LBS

## 2017-12-27 PROCEDURE — 27000221 HC OXYGEN, UP TO 24 HOURS

## 2017-12-27 PROCEDURE — 94761 N-INVAS EAR/PLS OXIMETRY MLT: CPT

## 2017-12-27 PROCEDURE — 63600175 PHARM REV CODE 636 W HCPCS: Performed by: STUDENT IN AN ORGANIZED HEALTH CARE EDUCATION/TRAINING PROGRAM

## 2017-12-27 RX ORDER — ACETAMINOPHEN 650 MG/1
650 SUPPOSITORY RECTAL EVERY 6 HOURS PRN
Qty: 12 SUPPOSITORY | Refills: 3 | COMMUNITY
Start: 2017-12-27

## 2017-12-27 RX ORDER — GLYCOPYRROLATE 0.2 MG/ML
0.2 INJECTION INTRAMUSCULAR; INTRAVENOUS EVERY 8 HOURS PRN
Qty: 2 ML | Refills: 5 | Status: SHIPPED | OUTPATIENT
Start: 2017-12-27

## 2017-12-27 RX ADMIN — DORZOLAMIDE HYDROCHLORIDE AND TIMOLOL MALEATE 1 DROP: 20; 5 SOLUTION/ DROPS OPHTHALMIC at 09:12

## 2017-12-27 RX ADMIN — MORPHINE SULFATE 0.5 MG: 10 INJECTION INTRAVENOUS at 02:12

## 2017-12-27 NOTE — PROGRESS NOTES
The Yadira faxed the corrected d/c orders to Faiza at [facility] via [facility].     4:06pm The Sw faxed the corrected d/c orders again to Faiza via [employer].

## 2017-12-27 NOTE — PROGRESS NOTES
The Sw called the pt's Locet in and faxed the PASRR to [facility]. The Sw spoke to th ept's sister Poly and she in agreement with [facility]. The Sw gave her the contact name and number and she states she will call and arrange a time to sign the admit papers she states b/c she's over the pt's finances. The Sw informed her it could b e today if the 142 is received in enough time and she's agreeable to a d/c today if not tomorrow. The Sw will f/u with her.

## 2017-12-27 NOTE — PROGRESS NOTES
The Sw spoke to Faiza at [facility]([phone])and the pt has been medically accepted to their facility and she states Malathi spoke to a family member yesterday and they were in agreement with the d/c plan to [facility]. The Sw spoke to the pt's dtr Lee Ann([phone])who states she's in agreement with the pt going into a nh b/c she knows her aunt can't  on the pt and his dementia's getting worse. She's in agreement with [facility] nh with hospice if required due to the cancer. The Sw spoke to the pt's sister Donna([phone])who states she's in agreement with [facility] but states she hasn't spoken to anyone there,she states they probably spoke to her sister Poly. The Sw left a message for Poly to return the call. Donna states she doesn't know who will sign the pt in. Lee Ann states she lives in [place], [place] and will not be in town until next month. Donna states somebody will be available to sign the pt in. [facility] normally allows the pt to come and the family sign after. The Sw encouraged them to decide b/c the admit papers will have to be signed.

## 2017-12-27 NOTE — DISCHARGE INSTRUCTIONS
Anemia (English) View Edit Remove  Acetaminophen chewable tablets (English) View Edit Remove  Glycopyrrolate tablets (English) View Edit Remove  Hypercalcemia, Discharge Instructions (English) View Edit Remove

## 2017-12-27 NOTE — PLAN OF CARE
12/27/17 1500   Transport Information   Transport Diagnosis (failure to thrive)   Transportation Date 12/27/17   Transported From [facility]   Transported To [facility] to Hospital transfer is required due to unavailable services no   Supporting Clinical Information   Unable to sit or travel in a seated position because Bed Confined (unable to ambulate/sit);Postural Instability   Comment (The pt can't ride in a w/c because of postural instability)   Insurance carrier notified  (The pt's can't sit up,he's not lethargic)

## 2017-12-27 NOTE — PROGRESS NOTES
"[facility] IM Resident HO-I Progress Note    Subjective:      No acute events overnight. Patient sleeping this morning, in no apparent distress. Does not respond to questions. Opens eyes, withdraws to pain. Does not speak. No family at bedside for interview.      Objective:   Last 24 Hour Vital Signs:  BP  Min: 101/53  Max: 174/81  Temp  Av °F (36.7 °C)  Min: 97.6 °F (36.4 °C)  Max: 98.8 °F (37.1 °C)  Pulse  Av.6  Min: 87  Max: 120  Resp  Av.8  Min: 18  Max: 20  SpO2  Av.7 %  Min: 93 %  Max: 96 %  Height  Av' 6" (167.6 cm)  Min: 5' 6" (167.6 cm)  Max: 5' 6" (167.6 cm)  Weight  Av.7 kg (136 lb 0.4 oz)  Min: 61.7 kg (136 lb 0.4 oz)  Max: 61.7 kg (136 lb 0.4 oz)  I/O last 3 completed shifts:  In: -   Out: 1179 [Urine:1179]    Physical Examination:  General: Thin, malnourished male, NAD, opens eyes and grunts to sternal rum.   Eyes:  Corneal opacity to right eye. Left pupil 2 mm, do not appreciate any response to light. Patient clamps eyes shut during eye examination   Mouth:  Poor dentition, moist mucous membranes, Thick oral secretions, cleared with suctioning.   Cardio: tachycardic, normal rhythm with normal S1 and S2; no murmurs or rubs  Resp: On 3L NC, Course inspiratory breath sounds, worse on right than left.  Abdom:  Nondistended, BS+, soft, nontender to palpation  Extrem: No lower extremity edema, 2+ distal pulses  Skin: No rashes, lesions, or color changes  Pulses: 2+ and symmetric distally  Neuro: Nonverbal, patient withdraws to pain, opens eyes to sternal rub and oral suctioning, Unable to assess strength secondary to inability to participate.     Laboratory:  Laboratory Data Reviewed:   Pertinent Findings:  .icc    Microbiology Data Reviewed:   Pertinent Findings:      Other Results:  EKG (my interpretation):     Radiology Data Reviewed:   Pertinent Findings:      Current Medications:     Infusions:       Scheduled:   dorzolamide-timolol 2-0.5%  1 drop Both Eyes BID    latanoprost  1 " drop Both Eyes QHS        PRN:  acetaminophen, glycopyrrolate, LORazepam, morphine, sodium chloride 0.9%    Antibiotics and Day Number of Therapy:      Lines and Day Number of Therapy:      Assessment:     Mika Valenzuela is a 82 y.o.male with  Patient Active Problem List    Diagnosis Date Noted    Tachycardia     Acute hypernatremia     Failure to thrive in adult 12/22/2017    Hypercalcemia of malignancy 12/22/2017    Fecal impaction in rectum 12/22/2017    Vascular dementia without behavioral disturbance 06/30/2017    Peripheral neuropathy 06/01/2015    Compression fracture of L5 lumbar vertebra 01/14/2015    Anemia of chronic disease 12/30/2014    Glaucoma 12/30/2014    HLD (hyperlipidemia) 12/30/2014    Severe sepsis 12/29/2014    Benign prostatic hyperplasia without lower urinary tract symptoms 12/29/2014    Lumbosacral radiculopathy 12/03/2014    Sacroiliitis 11/03/2014    Chronic low back pain 09/17/2014    IgA myeloma 02/28/2014    Constipation 09/18/2013    Anemia due to vitamin B12 deficiency 08/26/2013    Chronic hepatitis C 07/19/2012    Essential hypertension 07/19/2012        Plan:     Severe Sepsis   -Patient spiked fever to 103 this morning.   -Started on Vancomycin and Zosyn.  -Fever, HR >90, Lactic acidosis  -Started on Vanc and Zosyn   -Blood cultures x 2 - NGTD x 1 say  -UA - 1+ occult blood with 2.0-3.0 urobilogen, 7 RBCs/hpf occsasional bacteria, 1 WBC/hpf  - s/p 1L bolus x 1   -CXR - interval development of right perihilar consolidation   -Transition to comfort care      Failure to thrive  - Patient with decreased PO intake over the past three days  - Hypernatremic to 149 with dry mucous membranes on presentation   - Patient with history of multiple myeloma  - CT abdomen/pelvis - no masses noted   - CT head - no masses noted  - Lactic acid elevated at 2.9  - Received 2L IVF in ED --> fluid recuscitation with continuous IV normal saline at 125cc/hr administered overnight  with improvement   - Aspiration precuations  - Consult nutrition and speech therapy. Patient unable to participate in swallow study. Nutrition Signing off.   - Patient still not responsive this morning, withdraws to pain. Febrile overnight. Now with AG Acidosis at 17. Lactic Acid increased to 2.8 on 12/24/2017  -Transition to comfort care    Hypercalcemia  -  Calcium elevated at 12.1 on presentation, increased from baseline of 9.5  - Will work-up with calcitrol (pending) , PTH decreased at 5.2, and PTHrP (pending)  -Received 1 dose of pamidronate on 12/23/2017  - Corrected calcium 10.68 12/25/2017  - Transition to comfort care       Constipation  - CT abdomen/pelvis - moderate to large amount of colonic and rectal fecal material suggestion constipation with prominent rectal stool ball , no associted obstruction  - Soap suds enema administered --> patient with bowel movement overnight on 12/23/2017  - Abdomen soft this morning  - Continue to monitor  -Transition  to comfort care     IgA kappa ultiple myeloma  - Followed by heme-onc, last seen October 2017  - Treated with Velcade and dexamethasone September 2014-December 2014; second course of treatment from March 2016-February 2017  - Last chemotherapy treatment February 2017   - not a candidate for any further chemotherapy secondary to progressively worsening vascular dementia  -follow up with heme-onc  -Transition to comfort care     Vascular dementia  - Followed by neurology, last seen August 2017  - Progressively worsening, denies history of CVA  - Continue home medications which include Namenda 5mg PO daily and Efefxor XR 37.5mg PO nightly. Discontinued 2/2 to patient being unable to participate in/ pass a swallow study.   - CT head with senescent changes  - Neurology follow-up scheduled 2/9/18   -Transition to comfort care     HTN:  - BP elevated at 167/93 on presentation  - Home meds include atenolol 50mg PO daily and amlodipine 10mg PO daily - Holding as  patient has not been able to participate in a swallow study.   - Continue home medications when patient able to tolerate PO  - PRN labetalol for SBP >180 if patient unable to swallow medications  - /95 this morning  - Continue to monitor in setting of sepsis- PRN labetalol order discontinued.   -Transition to comfort care     Peripheral polyneuropathy  - Continue gabapentin 200mg PO TID  - No acute issues  -Transition to comfort care     Normocytic anemia  - H/H 11.1/33.8 with , increased from baseline hemoglobin 10.4, likely secondary to hemoconcentration  - Likely secondary to chronic disease  - Work-up with iron, TIBC, ferritin, folate, and B12  - Low B12 - Cyanocobalamin tablet 1,000 mcg daily  -Transition to comfort care     BPH:  - Continue home medication including tamulosin 0.4mg PO daily  - Patient with urinary incontinence  - No astorga catheter  - Bladder scans as needed  -Transition to comfort care.      Right eye blindness  - No acute issues  -Transition to comfort care    Disposition: Transition to hospice care.      Fitz Porter  [facility] Internal Medicine HO-I  [facility] IM Service Team A    [facility] Medicine Hospitalist Pager numbers:   [facility] Hospitalist Medicine Team A (Nerissa/Michael): [phone]  [facility] Hospitalist Medicine Team B (Enriqueta/Estiven):  [phone]

## 2017-12-27 NOTE — PLAN OF CARE
SW working on NHP with hospice- family decided on [facility]. SW awaiting 142 from [facility].       12/27/17 1331   Discharge Reassessment   Assessment Type Discharge Planning Reassessment   Discharge Plan A New Nursing Home placement - [facility]     Lori Rider, RN, [facility], CMSRN  RN Transition Navigator  [phone]

## 2017-12-27 NOTE — PROGRESS NOTES
The Sw spoke to Faiza at [facility] and faxed her the 142 and the d/c orders via [facility]. She will bring the info to her DON for approval. The Sw informed her the pt's ready for d/c today. She will call the pt's sister Poly to speak with her.

## 2017-12-27 NOTE — PLAN OF CARE
[facility]    FACILITY TRANSFER ORDERS      Patient Name: Mika Valenzuela  YOB: 1935    PCP: Lily Valladares MD   PCP Address: [address] [place] [address] / [place] [address]  PCP Phone Number: [phone]  PCP Fax: [phone]    Encounter Date: 12/27/2017    Admit to: [facility]     Vital Signs:  Routine    Diagnoses:   Active Hospital Problems    Diagnosis  POA    *Hypercalcemia of malignancy [E83.52]  Yes    Tachycardia [R00.0]  Yes    Acute hypernatremia [E87.0]  Yes    Failure to thrive in adult [R62.7]  Yes    Fecal impaction in rectum [K56.41]  Yes    Vascular dementia without behavioral disturbance [F01.50]  Yes    Anemia of chronic disease [D63.8]  Yes    Glaucoma [H40.9]  Yes    HLD (hyperlipidemia) [E78.5]  Yes    Benign prostatic hyperplasia without lower urinary tract symptoms [N40.0]  Yes    Severe sepsis [A41.9, R65.20]  Yes    IgA myeloma [C90.00]  Yes    Anemia due to vitamin B12 deficiency [D51.9]  Yes    Chronic hepatitis C [B18.2]  Yes    Essential hypertension [I10]  Yes      Resolved Hospital Problems    Diagnosis Date Resolved POA   No resolved problems to display.       Allergies:Review of patient's allergies indicates:  No Known Allergies    Diet: Pleasure feeds    Activities: Activity as tolerated and Bed rest    Nursing:    Comfort care with pleasure feeds      CONSULTS:    Please consult hospice care services   -Transitioning to comfort care.         Medications: Review discharge medications with patient and family and provide education.      Current Discharge Medication List      START taking these medications    Details   acetaminophen (TYLENOL) 650 MG Supp Place 1 suppository (650 mg total) rectally every 6 (six) hours as needed (For fever >100.4).  Qty: 12 suppository, Refills: 3      Hyoscyamine   0.125 mg every 4 hours prn sub-lingual      Place 1 tablet underneath the tongue every 4 hours as needed for oral secretions.           STOP taking these medications       acyclovir (ZOVIRAX) 400 MG tablet Comments:   Reason for Stopping:         amlodipine (NORVASC) 10 MG tablet Comments:   Reason for Stopping:         atenolol (TENORMIN) 50 MG tablet Comments:   Reason for Stopping:         bortezomib (VELCADE) 3.5 mg injection Comments:   Reason for Stopping:         brimonidine 0.15 % OPTH DROP (ALPHAGAN) 0.15 % ophthalmic solution Comments:   Reason for Stopping:         dexamethasone (DECADRON) 4 MG Tab Comments:   Reason for Stopping:         difluprednate (DUREZOL) 0.05 % Drop ophthalmic solution Comments:   Reason for Stopping:         dorzolamide-timolol 2-0.5% (COSOPT) 2-0.5 % ophthalmic solution Comments:   Reason for Stopping:         gabapentin (NEURONTIN) 100 MG capsule Comments:   Reason for Stopping:         latanoprost (XALATAN) 0.005 % ophthalmic solution Comments:   Reason for Stopping:         memantine (NAMENDA) 5 MG Tab Comments:   Reason for Stopping:         tamsulosin (FLOMAX) 0.4 mg Cp24 Comments:   Reason for Stopping:         venlafaxine (EFFEXOR-XR) 37.5 MG 24 hr capsule Comments:   Reason for Stopping:     glycopyrrolate (ROBINUL) 0.2 mg/mL injection Stop taking                  _________________________________  Fitz Porter MD  12/27/2017

## 2017-12-27 NOTE — PROGRESS NOTES
The Sw spoke to Faiza at [facility] and the pt's clear to arrive. The pt's doing to room 17-B. The Sw spoke to the pt's nurse and gave him the contact info to call report along with the copied chart. The Sw spoke to the pt's sister Poly and she was in agreement with the d/c plan to [facility]. She will call [facility] to arrange a time to arrive tomorrow to sign the admissions paperwork. The Sw arranged the pt's transport with [facility]([phone])for 4:30pm. The pt choice form has been completed and placed in the pt's chart. Faiza stated they will f/u with [facility] later or tomorrow.

## 2017-12-27 NOTE — PLAN OF CARE
12/27/17 1527   Final Note   Assessment Type Final Discharge Note   Discharge Disposition [facility]   Right Care Referral Info   Post Acute Recommendation Other   Referral Type long-term placement with hospice   Facility Name [facility] [place] [place]

## 2017-12-27 NOTE — PLAN OF CARE
SW making final NH arrangements with transportation.       12/27/17 1517   Final Note   Assessment Type Final Discharge Note   Discharge Disposition [facility]   Right Care Referral Info   Referral Type retirement NH   Facility Name [facility]     Lori Rider RN, [facility], CMSRN  RN Transition Navigator  [phone]

## 2017-12-28 LAB
1,25(OH)2D3 SERPL-MCNC: <5 PG/ML
PTH RELATED PROT SERPL-SCNC: 0.9 PMOL/L

## 2017-12-28 NOTE — DISCHARGE SUMMARY
[facility] IM Discharge Summary    Primary Team: [facility] Team A  Attending Physician: Dr. guerrero  Resident: Lee Ann liang MD  Intern: Sahil Porter    Date of Admit: 12/22/2017  Date of Discharge: 12/27/2017    Discharge to: [facility]   Condition: End stage vascular dementia. Transitioning to comfort care.     Discharge Diagnoses     Patient Active Problem List   Diagnosis    Chronic hepatitis C    Essential hypertension    Anemia due to vitamin B12 deficiency    Constipation    IgA myeloma    Chronic low back pain    Sacroiliitis    Lumbosacral radiculopathy    Severe sepsis    Benign prostatic hyperplasia without lower urinary tract symptoms    Anemia of chronic disease    Glaucoma    HLD (hyperlipidemia)    Compression fracture of L5 lumbar vertebra    Peripheral neuropathy    Vascular dementia without behavioral disturbance    Failure to thrive in adult    Hypercalcemia of malignancy    Fecal impaction in rectum    Acute hypernatremia    Tachycardia       Consultants and Procedures     Consultants:  Nutrition     Procedures:   None    Brief History of Present Illness      Mika Valenzuela is a 82 y.o.  male who  has a past medical history of Anemia; Back pain; BPH (benign prostatic hypertrophy); Chronic constipation; Colon polyp; Diverticulosis; GERD (gastroesophageal reflux disease); Glaucoma (increased eye pressure); Hepatitis C; Hypertension; Kidney stone; Myeloma; and Urinary tract infection.  The patient presented to [facility] on 12/22/2017 with a primary complaint of Fatigue (not eating or drinking well and feeling weak x 3 days, history of cancer)  .     Patient brought to ED by sister, who is his primary caretaker, for weakness and decreased appetite for the past three days. Patient has a Pmh vascular dementia and multiple myeloma. At baseline, patient is able is able to feed himself, walk with a cane, and go to the bathroom independently. Over the past  month, he has progressively become less independent with these tasks. tates the patient has been having more frequent visual hallucinations, has been more agitated and irritible, more incontinent of urine, and more difficult to communicate with. Over the past 3 days, the patient' sister has noticed decreased appetite and weakness.  Patient complains of abdominal pain and constipation. Denies fever, chills, nausea, vomiting, chest pain, shortness of breath, or diarrhea    For the full HPI please refer to the History & Physical from this admission.    Hospital Course By Problem with Pertinent Findings     1. Severe Sepsis   -The patient spiked fever to 103 after admission.   -He was initiated on Vancomycin and Zosyn.  -He met severe sepsis criteria with a Fever, HR >90, and Lactic acidosis  -His Blood cultures x 2 - Showed no growth on preliminary results  -UA - 1+ occult blood with 2.0-3.0 urobilogen, 7 RBCs/hpf occsasional bacteria, 1 WBC/hpf  - s/p 1L bolus x 1  -Urine culture showed no growth.   -His CXR  Showed interval development of right perihilar consolidation.   -After 2 days of antibiotics, antibiotic therapy was found to be futile.   -a discussion was had with the family about goals of care and it was decided that the best course of action for the patient would be hospice care.    -He was transitioned to comfort care.      Failure to thrive  - The patient had decreased PO intake over the previous three days prior to admission  - He was Hypernatremic to 149 with dry mucous membranes on presentation.   - The patient has a history of multiple myeloma  - The patient had a CT abdomen/pelvis completed and no masses were noted   -The patient's CT head did not show any masses.   - The patient had a Lactic acid elevation of 2.9  - He received 2L IVF in ED --> fluid recuscitation with continuous IV normal saline at 125cc/hr administered overnight with improvement   - He was placed on aspiration precautions  -  nutrition was consulted as well as speech therapy. The Patient unable to participate in swallow study. Nutrition Signed off.    - The patient remained minimally responsive and would withdraw to pain. He remained febrile and developed an AG Acidosis at 17. Lactic Acid increased to 2.8 on 12/24/2017  -Transition to comfort care    Hypercalcemia  -  Calcium elevated at 12.1 on presentation, increased from baseline of 9.5  - Will work-up with calcitrol (pending) , PTH decreased at 5.2, and PTHrP (pending)  -Received 1 dose of pamidronate on 12/23/2017  - Corrected calcium 10.68 12/25/2017  - Transition to comfort care       Constipation  - CT abdomen/pelvis - moderate to large amount of colonic and rectal fecal material suggestion constipation with prominent rectal stool ball , no associted obstruction  - Soap suds enema administered --> patient with bowel movement overnight on 12/23/2017  - Abdomen soft this morning  - Continue to monitor  -Transition  to comfort care     IgA kappa ultiple myeloma  - Followed by heme-onc, last seen October 2017  - Treated with Velcade and dexamethasone September 2014-December 2014; second course of treatment from March 2016-February 2017  - Last chemotherapy treatment February 2017   - not a candidate for any further chemotherapy secondary to progressively worsening vascular dementia  -follow up with heme-onc  -Transition to comfort care     Vascular dementia  - Followed by neurology, last seen August 2017  - Progressively worsening, denies history of CVA  - Continue home medications which include Namenda 5mg PO daily and Efefxor XR 37.5mg PO nightly. Discontinued 2/2 to patient being unable to participate in/ pass a swallow study.   - CT head with senescent changes  - Neurology follow-up scheduled 2/9/18   -Transition to comfort care     HTN:  - BP elevated at 167/93 on presentation  - Home meds include atenolol 50mg PO daily and amlodipine 10mg PO daily - Holding as patient has not  been able to participate in a swallow study.   - Continue home medications when patient able to tolerate PO  - PRN labetalol for SBP >180 if patient unable to swallow medications  - /95 this morning  - Continue to monitor in setting of sepsis- PRN labetalol order discontinued.   -Transition to comfort care     Peripheral polyneuropathy  - Continue gabapentin 200mg PO TID  - No acute issues  -Transition to comfort care     Normocytic anemia  - H/H 11.1/33.8 with , increased from baseline hemoglobin 10.4, likely secondary to hemoconcentration  - Likely secondary to chronic disease  - Work-up with iron, TIBC, ferritin, folate, and B12  - Low B12 - Cyanocobalamin tablet 1,000 mcg daily  -Transition to comfort care     BPH:  - THe patient was Continued on home medication including tamulosin 0.4mg PO daily  -The patient has a history of urinary incontinence  -Bladder scans were obtained as needed.  -He was transitioned to comfort care.      Right eye blindness  - There were no acute issues during this hospital admission.  -Transitioned to comfort care.    Discharge Medications        Medication List      START taking these medications    acetaminophen 650 MG Supp  Commonly known as:  TYLENOL  Place 1 suppository (650 mg total) rectally every 6 (six) hours as needed (For fever >100.4).     glycopyrrolate 0.2 mg/mL injection  Commonly known as:  ROBINUL  Inject 1 mL (0.2 mg total) into the vein every 8 (eight) hours as needed (as needed for secretions).        STOP taking these medications    acyclovir 400 MG tablet  Commonly known as:  ZOVIRAX     amLODIPine 10 MG tablet  Commonly known as:  NORVASC     atenolol 50 MG tablet  Commonly known as:  TENORMIN     bortezomib 3.5 mg injection  Commonly known as:  VELCADE     brimonidine 0.15 % OPTH DROP 0.15 % ophthalmic solution  Commonly known as:  ALPHAGAN     dexamethasone 4 MG Tab  Commonly known as:  DECADRON     dorzolamide-timolol 2-0.5% 22.3-6.8 mg/mL  ophthalmic solution  Commonly known as:  COSOPT     DUREZOL 0.05 % Drop ophthalmic solution  Generic drug:  difluprednate     gabapentin 100 MG capsule  Commonly known as:  NEURONTIN     latanoprost 0.005 % ophthalmic solution     memantine 5 MG Tab  Commonly known as:  NAMENDA     tamsulosin 0.4 mg Cp24  Commonly known as:  FLOMAX     venlafaxine 37.5 MG 24 hr capsule  Commonly known as:  EFFEXOR-XR           Where to Get Your Medications      These medications were sent to [facility] - [address]  [address], [place] [address]    Phone:  [phone]   · glycopyrrolate 0.2 mg/mL injection     You can get these medications from any pharmacy    You don't need a prescription for these medications  · acetaminophen 650 MG Supp         Discharge Information:   Diet:  Pleasure feeds    Physical Activity:  Bed rest    Instructions:  1. Take all medications as prescribed  2. Keep all follow-up appointments  3. Return to the hospital or call your primary care physicians if any worsening symptoms.    Follow-Up Appointments:      Fitz Porter  [facility], [facility]

## 2017-12-29 LAB
BACTERIA BLD CULT: NORMAL
BACTERIA BLD CULT: NORMAL

## 2018-01-12 ENCOUNTER — TELEPHONE (OUTPATIENT)
Dept: INTERNAL MEDICINE | Facility: CLINIC | Age: 83
End: 2018-01-12

## 2018-01-12 NOTE — TELEPHONE ENCOUNTER
----- Message from Ananya Andrews sent at 1/12/2018 10:10 AM CST -----  Contact: [phone]  Patient's sister called to let Dr. Valladares know that he past away on 12/31/17. Please advice
